# Patient Record
Sex: MALE | Race: BLACK OR AFRICAN AMERICAN | NOT HISPANIC OR LATINO | Employment: FULL TIME | URBAN - METROPOLITAN AREA
[De-identification: names, ages, dates, MRNs, and addresses within clinical notes are randomized per-mention and may not be internally consistent; named-entity substitution may affect disease eponyms.]

---

## 2023-01-15 ENCOUNTER — NURSE TRIAGE (OUTPATIENT)
Dept: ADMINISTRATIVE | Facility: CLINIC | Age: 38
End: 2023-01-15

## 2023-01-15 NOTE — TELEPHONE ENCOUNTER
Attempted to call pt x2, no answer. No VM available. No alternate phone number listed.     Reason for Disposition   Second attempt to contact caller AND no contact made. Phone number verified.    Protocols used: No Contact or Duplicate Contact Call-A-AH

## 2023-01-16 ENCOUNTER — NURSE TRIAGE (OUTPATIENT)
Dept: ADMINISTRATIVE | Facility: CLINIC | Age: 38
End: 2023-01-16

## 2023-01-16 ENCOUNTER — TELEPHONE (OUTPATIENT)
Dept: ADMINISTRATIVE | Facility: CLINIC | Age: 38
End: 2023-01-16

## 2023-01-16 NOTE — TELEPHONE ENCOUNTER
Pt contacted OOC. Made 2 attempts to reach pt with no success. Left voicemail advising if still need further assistance to contact OOC.    Reason for Disposition   Message left on unidentified voice mail.  Phone number verified.    Protocols used: No Contact or Duplicate Contact Call-A-

## 2023-06-09 ENCOUNTER — OFFICE VISIT (OUTPATIENT)
Dept: BEHAVIORAL HEALTH | Facility: CLINIC | Age: 38
End: 2023-06-09
Payer: MEDICAID

## 2023-06-09 VITALS
OXYGEN SATURATION: 100 % | WEIGHT: 249.88 LBS | SYSTOLIC BLOOD PRESSURE: 138 MMHG | HEART RATE: 71 BPM | DIASTOLIC BLOOD PRESSURE: 87 MMHG | TEMPERATURE: 97 F

## 2023-06-09 DIAGNOSIS — F41.9 ANXIETY: ICD-10-CM

## 2023-06-09 DIAGNOSIS — F33.1 MODERATE EPISODE OF RECURRENT MAJOR DEPRESSIVE DISORDER: Primary | ICD-10-CM

## 2023-06-09 PROCEDURE — 1159F MED LIST DOCD IN RCRD: CPT | Mod: CPTII,,, | Performed by: STUDENT IN AN ORGANIZED HEALTH CARE EDUCATION/TRAINING PROGRAM

## 2023-06-09 PROCEDURE — 1160F PR REVIEW ALL MEDS BY PRESCRIBER/CLIN PHARMACIST DOCUMENTED: ICD-10-PCS | Mod: CPTII,,, | Performed by: STUDENT IN AN ORGANIZED HEALTH CARE EDUCATION/TRAINING PROGRAM

## 2023-06-09 PROCEDURE — 1159F PR MEDICATION LIST DOCUMENTED IN MEDICAL RECORD: ICD-10-PCS | Mod: CPTII,,, | Performed by: STUDENT IN AN ORGANIZED HEALTH CARE EDUCATION/TRAINING PROGRAM

## 2023-06-09 PROCEDURE — 3075F PR MOST RECENT SYSTOLIC BLOOD PRESS GE 130-139MM HG: ICD-10-PCS | Mod: CPTII,,, | Performed by: STUDENT IN AN ORGANIZED HEALTH CARE EDUCATION/TRAINING PROGRAM

## 2023-06-09 PROCEDURE — 99205 OFFICE O/P NEW HI 60 MIN: CPT | Mod: S$PBB,,, | Performed by: STUDENT IN AN ORGANIZED HEALTH CARE EDUCATION/TRAINING PROGRAM

## 2023-06-09 PROCEDURE — 1160F RVW MEDS BY RX/DR IN RCRD: CPT | Mod: CPTII,,, | Performed by: STUDENT IN AN ORGANIZED HEALTH CARE EDUCATION/TRAINING PROGRAM

## 2023-06-09 PROCEDURE — 3075F SYST BP GE 130 - 139MM HG: CPT | Mod: CPTII,,, | Performed by: STUDENT IN AN ORGANIZED HEALTH CARE EDUCATION/TRAINING PROGRAM

## 2023-06-09 PROCEDURE — 3079F DIAST BP 80-89 MM HG: CPT | Mod: CPTII,,, | Performed by: STUDENT IN AN ORGANIZED HEALTH CARE EDUCATION/TRAINING PROGRAM

## 2023-06-09 PROCEDURE — 99213 OFFICE O/P EST LOW 20 MIN: CPT | Mod: PBBFAC,PN | Performed by: STUDENT IN AN ORGANIZED HEALTH CARE EDUCATION/TRAINING PROGRAM

## 2023-06-09 PROCEDURE — 99205 PR OFFICE/OUTPT VISIT, NEW, LEVL V, 60-74 MIN: ICD-10-PCS | Mod: S$PBB,,, | Performed by: STUDENT IN AN ORGANIZED HEALTH CARE EDUCATION/TRAINING PROGRAM

## 2023-06-09 PROCEDURE — 3079F PR MOST RECENT DIASTOLIC BLOOD PRESSURE 80-89 MM HG: ICD-10-PCS | Mod: CPTII,,, | Performed by: STUDENT IN AN ORGANIZED HEALTH CARE EDUCATION/TRAINING PROGRAM

## 2023-06-09 RX ORDER — VENLAFAXINE HYDROCHLORIDE 75 MG/1
75 CAPSULE, EXTENDED RELEASE ORAL DAILY
Qty: 30 CAPSULE | Refills: 2 | Status: SHIPPED | OUTPATIENT
Start: 2023-06-09 | End: 2023-07-27 | Stop reason: SDUPTHER

## 2023-06-09 RX ORDER — ESZOPICLONE 3 MG/1
3 TABLET, FILM COATED ORAL NIGHTLY PRN
COMMUNITY
Start: 2023-05-28

## 2023-06-09 RX ORDER — MODAFINIL 200 MG/1
200 TABLET ORAL EVERY MORNING
COMMUNITY
Start: 2023-04-17 | End: 2023-12-18 | Stop reason: SDUPTHER

## 2023-06-09 RX ORDER — ALPRAZOLAM 2 MG/1
2 TABLET ORAL 3 TIMES DAILY
COMMUNITY
Start: 2023-02-07 | End: 2023-08-17 | Stop reason: SDUPTHER

## 2023-06-09 RX ORDER — VORTIOXETINE 20 MG/1
1 TABLET, FILM COATED ORAL
COMMUNITY
Start: 2023-05-25 | End: 2023-06-09 | Stop reason: DRUGHIGH

## 2023-06-09 RX ORDER — VORTIOXETINE 10 MG/1
10 TABLET, FILM COATED ORAL DAILY
Qty: 30 TABLET | Refills: 0 | Status: SHIPPED | OUTPATIENT
Start: 2023-06-09 | End: 2023-07-27 | Stop reason: ALTCHOICE

## 2023-06-09 NOTE — PROGRESS NOTES
"Outpatient Psychiatry Initial Visit    6/9/2023    Sandy Evans, a 37 y.o. male, presenting for initial evaluation visit. Met with patient.    Reason for Encounter:   Referred from: Marciano Dominguez MD  Reason for referral: "depression"  Chief complaint: depression x years    History of Present Illness:   Pt is a 36yo M w/ PPHx of depression and anxiety  who presents to psychiatry clinic for evaluation.      Pt referred from PCP for worsening depression.  Currently prescribed xanax 2mg tid, lunesta 3mg nightly, and trintellix 20mg daily.  Voiced worsening depression with suicidal thinking at last clinic note. Trintellix increased to 20mg daily at visit with PCP.  Notes depression has been worse of past 4 months than typical for him. 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  Has been feeling burnt out, looking for new employment.      Regarding depression, pt endorses history of depressive episodes.  Regarding historical depressive episodes, episodes usually last weeks to months in duration.  Episodes are usually associated with identifiable triggers.  Depressive mood associated with decreased appetite, poor sleep (unrestful sleep), poor concentration, decreased energy, + anhedonia, poor motivation, endorses irritability, endorses hopelessness.  Endorses history of suicidal thoughts (mostly rumination about death), denies history of suicide attempts.  Denies current suicidal ideation, denies plan or desire for self harm or harm to others.    Denies history of episodes concerning for mike/hypomania.      Denies history of hallucinations or other altered perceptions, denies paranoid ideation.      Endorses excess worry/anxiety.  Worries are mostly associated with ongoing stressors, anxiety worsens with severe depression.  Notes associated symptoms: denies rumination, denies sleep difficulty, poor concentration, endorses irritability, denies tension or " "feeling "on edge," denies muscle tension, denies HA, denies GI upset. Denies panic attacks.  Taking xanax once monthly.     Meds Hx (has pt taken the following):   SSRIs: lexapro (helpful, no SE)  SNRIs: denies  TCAs: denies  Atypical ADs: trintellix (somewhat helpful, SE nausea)  Anxiolytics: xanax (somewhat helpful, no SE)  Neuroleptics: denies  Mood stabilizers: denies  Stimulants: denies  Other: lunesta (helpful, SE morning grogginess)    History:     Allergies:  Patient has no known allergies.    Past Medical/Surgical History:  History reviewed. No pertinent past medical history.  History reviewed. No pertinent surgical history.    Medications  Outpatient Encounter Medications as of 6/9/2023   Medication Sig Dispense Refill    ALPRAZolam (XANAX) 2 MG Tab Take 2 mg by mouth 3 (three) times daily.      eszopiclone (LUNESTA) 3 mg Tab Take 3 mg by mouth nightly as needed.      modafiniL (PROVIGIL) 200 MG Tab Take 200 mg by mouth every morning.      [DISCONTINUED] TRINTELLIX 20 mg Tab Take 1 tablet by mouth.      venlafaxine (EFFEXOR-XR) 75 MG 24 hr capsule Take 1 capsule (75 mg total) by mouth once daily. 30 capsule 2    vortioxetine (TRINTELLIX) 10 mg Tab Take 1 tablet (10 mg total) by mouth once daily. 30 tablet 0     No facility-administered encounter medications on file as of 6/9/2023.     Past Psychiatric History:  Previous Medication Trials: See above   Previous Psychiatric Hospitalizations: denies   Previous Suicide Attempts: denies   History of Violence: None in past 6 months  Outpatient mental health: seeing therapist (sees every 3 weeks)  Family History: mother with depression with catatonia (no ECT needed)    Social History:  Marital Status: single  Children: 0   Employment Status/Info: [employer]  Education: HS grad, college grad, [employer]  Housing Status: lives in house with mother  History of phys/sexual abuse: denies  Access to gun: denies    Substance Abuse History:  Tobacco Use: smoke cigares  Use " of Alcohol: 4x weekly, 1-2 glasses wine per sitting  Recreational Drugs: cannabis   Rehab/detox: denies    Legal History:  Past Charges/Incarcerations: denies   Pending charges: denies     Psychosocial Stressors: occupational    Review Of Systems:     Constitutional: denies fevers, denies chills, denies recent weight change  Eyes: denies pain in eyes or loss of vision  Ears: denies tinnitis, denies loss of hearing  Mouth/throat: denies difficulty with speaking, denies difficulty with swallowing  Cardiac: denies CP, denies palpitations  Respiratory: denies SOB, denies cough  Gastrointestinal: denies abdominal pain, denies nausea/vomiting, denies constipation/diarrhea  Genitourinary: denies urinary frequency, denies burning on urination  Dermatologic: denies rash, denies erythema  Musculoskeletal: denies myalgias, denies arthralgias  Hematologic: denies easy bleeding/bruising, denies enlarged lymph nodes  Neurologic: denies seizures, denies headaches, denies loss of sensation, denies weakness  Psychiatric: see HPI    Current Evaluation:     Nutritional Screening: Considering the patient's height and weight, medications, medical history and preferences, should a referral be made to the dietitian? no    Constitutional  Vitals:  Most recent vital signs, dated less than 90 days prior to this appointment, were reviewed.      Vitals:    06/09/23 0918   BP: 138/87   Pulse: 71   Temp: 97.3 °F (36.3 °C)   SpO2: 100%   Weight: 113.4 kg (249 lb 14.4 oz)        General:    No acute distress     Neurologic:   Motor: moves all extremities spontaneously and without difficulty  Gait: normal gait and station    Mental status examination:  Appearance: unremarkable, age appropriate  Level of Consciousness: awake and alert  Behavior/Cooperation: calm and cooperative  Psychomotor: psychomotor agitation  Speech:  rapid, not pressured, normal tone, normal volume  Language: english, fluid  Orientation: grossly intact, person, place,  "situation, day of week, month of year, year  Attention Span/Concentration: intact to interview and spells "WORLD" forwards and backwards without error  Memory: Registers 3/3 objects, recalls   /3 objects at 5 minutes without cuing  Mood: "mellow"  Affect: mood congruent and euthymic  Thought Process: tangential  Associations: Logical and appropriate  Thought Content: denies SI/HI/paranoia, no delusional ideation volunteered, denies plan or desire for self harm or harm to others  Fund of Knowledge: appropriate for education  Abstraction: proverbs were abstract and similarities were abstract  Insight: good  Judgment: good    Relevant Elements of Neurological Exam: no abnormal involuntary movements observed    Functioning in Relationships:  Spouse/partner: not in dating relationship  Peers: good  Employers: good    Assessments:   PHQ9:   PHQ9 6/9/2023   Total Score 11     GAD7:   GAD7 6/9/2023   1. Feeling nervous, anxious, or on edge? 0   2. Not being able to stop or control worrying? 1   3. Worrying too much about different things? 1   4. Trouble relaxing? 0   5. Being so restless that it is hard to sit still? 0   6. Becoming easily annoyed or irritable? 2   7. Feeling afraid as if something awful might happen? 0   8. If you checked off any problems, how difficult have these problems made it for you to do your work, take care of things at home, or get along with other people? 1   SUJEY-7 Score 4     Laboratory Data  No visits with results within 1 Month(s) from this visit.   Latest known visit with results is:   No results found for any previous visit.     Assessment - Diagnosis - Goals:     Sandy Evans, a 37 y.o. male, presenting for initial evaluation visit.     Impression:       ICD-10-CM ICD-9-CM   1. Moderate episode of recurrent major depressive disorder  F33.1 296.32   2. Anxiety  F41.9 300.00     Strengths and Liabilities: Strength: Patient accepts guidance/feedback, Strength: Patient is " expressive/articulate., Strength: Patient is intelligent.    Treatment Goals:  Specify outcomes written in observable, behavioral terms:   Anxiety: reducing physical symptoms of anxiety and reducing time spent worrying (<30 minutes/day)  Depression: increasing energy, increasing interest in usual activities, increasing motivation, and reducing fatigue    Treatment Plan/Recommendations:   Will cross titrate trintellix to effexor, pt notes <50% improvement with max dosing of trintellix  Decrease trintellix to 10mg daily, start effexor XR 75mg daily, Discussed potential SE including but not limited to GI upset, headache, HTN, tachycardia, suicidal thinking  Continue xanax prn (pt taking infrequently)  Continue lunesta 3mg daily  Recommend pt continue with current therapist  Encouraged pt to continue using CPAP for IRAIS, defer management of modafinil to PCP  No need for PEC as pt is not an imminent danger to self or others or gravely disabled due to acute psychiatric illness  Discussed that pt should either call clinic for psychiatric crisis symptoms or present to nearest emergency room    Discussed with patient informed consent including diagnosis, risks and benefits of proposed treatment above vs. alternative treatments vs. no treatment, as well as serious and common side effects of these treatments, and the inherent unpredictability of individual responses to these treatments. The patient expresses understanding of the above and displays the capacity to agree with this current plan. Patient also agrees that, currently, the benefits outweigh the risks and would like to pursue treatment at this time, and had no other questions.    Instructions:  Take all medications as prescribed.    Abstain from recreational drugs and alcohol.  Present to ED or call 911 for SI/HI plan or intent, psychosis, or medical emergency.    Return to Clinic: Follow up in about 2 weeks (around 6/23/2023) for Virtual Visit.    Total time:    Complexity (level) of medical decision making employed in the encounter: MODERATE    The total time for services performed on the date of the encounter (including review of prior visit notes, review of notes from other providers, review of results from laboratory/imaging studies, face-to-face time with patient, and time spent on other activities directly related to patient care): 60 minutes.    Uriah Donahue MD  [facility]

## 2023-06-19 ENCOUNTER — OFFICE VISIT (OUTPATIENT)
Dept: BEHAVIORAL HEALTH | Facility: CLINIC | Age: 38
End: 2023-06-19
Payer: MEDICAID

## 2023-06-19 DIAGNOSIS — F33.1 MODERATE EPISODE OF RECURRENT MAJOR DEPRESSIVE DISORDER: Primary | ICD-10-CM

## 2023-06-19 DIAGNOSIS — F41.9 ANXIETY: ICD-10-CM

## 2023-06-19 PROCEDURE — 99214 OFFICE O/P EST MOD 30 MIN: CPT | Mod: 95,,, | Performed by: STUDENT IN AN ORGANIZED HEALTH CARE EDUCATION/TRAINING PROGRAM

## 2023-06-19 PROCEDURE — 1160F RVW MEDS BY RX/DR IN RCRD: CPT | Mod: CPTII,95,, | Performed by: STUDENT IN AN ORGANIZED HEALTH CARE EDUCATION/TRAINING PROGRAM

## 2023-06-19 PROCEDURE — 1160F PR REVIEW ALL MEDS BY PRESCRIBER/CLIN PHARMACIST DOCUMENTED: ICD-10-PCS | Mod: CPTII,95,, | Performed by: STUDENT IN AN ORGANIZED HEALTH CARE EDUCATION/TRAINING PROGRAM

## 2023-06-19 PROCEDURE — 1159F MED LIST DOCD IN RCRD: CPT | Mod: CPTII,95,, | Performed by: STUDENT IN AN ORGANIZED HEALTH CARE EDUCATION/TRAINING PROGRAM

## 2023-06-19 PROCEDURE — 1159F PR MEDICATION LIST DOCUMENTED IN MEDICAL RECORD: ICD-10-PCS | Mod: CPTII,95,, | Performed by: STUDENT IN AN ORGANIZED HEALTH CARE EDUCATION/TRAINING PROGRAM

## 2023-06-19 PROCEDURE — 99214 PR OFFICE/OUTPT VISIT, EST, LEVL IV, 30-39 MIN: ICD-10-PCS | Mod: 95,,, | Performed by: STUDENT IN AN ORGANIZED HEALTH CARE EDUCATION/TRAINING PROGRAM

## 2023-06-19 NOTE — PROGRESS NOTES
"Outpatient Psychiatry Follow-Up Visit    6/19/2023    Clinical Status of Patient:  Outpatient (Ambulatory)    Chief Complaint:  Sandy Evans is a 37 y.o. male who presents today for follow-up of depression and anxiety. Patient last seen for initial evaluation on 6/9/2023. Met with patient.      TELE PSYCHIATRY Disclaimer   *The patient was informed despite using HIPPA compliant technology there may be risks including security breach, technological failure, inability to perform a comprehensive physical exam which could delay or prevent an accurate diagnosis, and potential complications from treatment decisions rendered over a telemedical platform.   The patient was also informed of the relationship between the physician and patient and the respective role of any other health care provider with respect to management of the patient; and notified that the pt may decline to receive medical services by telemedicine and may withdraw from such care at any time.     Patient's Current location: [address] [place] [address]    In Case of Emergency pts next of kin  Name:None provided  Phone number:  n/a  Visit type: Audio only  Total time spent with patient: 15 minutes    Interval History and Content of Current Session:  Interim Events/Subjective Report/Content of Current Session:   **Audio only visit due to pt having difficulty with accessing account.**    Pt reports doing "stable"  overall.  Reports stable mood, decreased anxiety.  Sleeping stable.  Appetite stable, weight stable.  Energy unchanged, motivation stable.  Improved irritability.  Denies SI/HI/AVH/paranoia, denies plan or desire for self harm or harm to others.  Denies SE from current regimen Denies somatic complaints.   Pt happy with current regimen and wants to continue.     Psychiatric Review of Systems-is patient experiencing or having changes in  Anxiety: decreased  Sleep: unchanged  Appetite: stable  Weight: stable  Energy: " "unchanged  Concentration: fair   Libido: no problem voiced  Irritability: denies  Motivation: stable  Guilt/hopelessness: denies  Paranoia/delusions: denies  SIB/risky behavior: denies    Review of Systems   PSYCHIATRIC: Pertinant items are noted in the narrative.  CONSTITUTIONAL: No weight gain or loss.  MUSCULOSKELETAL: No pain or stiffness of the joints.  NEUROLOGIC: No weakness, sensory changes, seizures, confusion, memory loss, tremor or other abnormal movements.  CARDIAC: No CP, no palpitations  RESPIRATORY: No shortness of breath.  CARDIOVASCULAR: No tachycardia or chest pain.  GASTROINTESTINAL: No nausea, vomiting, pain, constipation or diarrhea.    Past Medical, Family and Social History: The patient's past medical, family and social history have been reviewed and updated as appropriate within the electronic medical record - see encounter notes.    Compliance: good    Side effects: denies    Risk Parameters:  Patient reports no suicidal ideation  Patient reports no homicidal ideation  Patient reports no self-injurious behavior  Patient reports no violent behavior    Exam (detailed: at least 9 elements; comprehensive: all 15 elements)   Constitutional  Vitals:  Most recent vital signs, dated less than 90 days prior to this appointment, were reviewed.     There were no vitals filed for this visit.       General:   Constitutional: HAYDEN 2/2 virtual visit    Neurologic:   Motor: HAYDEN 2/2 virtual visit  Gait: HAYDEN 2/2 virtual visit    Mental status examination:   Appearance: HAYDEN 2/2 virtual visit  Behavior: calm and cooperative  Mood: "fine"  Affect: HAYDEN 2/2 virtual visit  Thought process: linear and goal directed  Associations: appropriate for conversation  Thought content: no plan or desire for self harm or harm to others, denies paranoia, no delusional ideation volunteered  Perceptions: denies hallucinations or other altered perceptions  Orientation: oriented to day of week, month, year, location, and " situation  Language: English, fluid  Attention: able to attend to interview  Insight: good  Judgement: good    PHQ9 6/9/2023   Total Score 11       GAD7 6/9/2023   1. Feeling nervous, anxious, or on edge? 0   2. Not being able to stop or control worrying? 1   3. Worrying too much about different things? 1   4. Trouble relaxing? 0   5. Being so restless that it is hard to sit still? 0   6. Becoming easily annoyed or irritable? 2   7. Feeling afraid as if something awful might happen? 0   8. If you checked off any problems, how difficult have these problems made it for you to do your work, take care of things at home, or get along with other people? 1   SUJEY-7 Score 4     Assessment and Diagnosis   Status/Progress: Based on the examination today, the patient's problem(s) is/are improved.  New problems have not been presented today.   Co-morbidities and Lack of compliance are not complicating management of the primary condition.  Number of separate conditions addressed during today's visit: 2 (anxiety stable, mood stable).  Are medication adjustments being made today: No.  Are referral(s) being ordered today: No.  Complexity (level) of medical decision making employed in the encounter: MODERATE.    General Impression:    ICD-10-CM ICD-9-CM   1. Moderate episode of recurrent major depressive disorder  F33.1 296.32   2. Anxiety  F41.9 300.00     Intervention/Counseling/Treatment Plan   Continue effexor XR 75mg daily  Continue xanax prn (pt taking infrequently)  Continue lunesta 3mg daily  Recommend pt continue with current therapist  Encouraged pt to continue using CPAP for IRAIS, defer management of modafinil to PCP  No need for PEC as pt is not an imminent danger to self or others or gravely disabled due to acute psychiatric illness  Discussed that pt should either call clinic for psychiatric crisis symptoms or present to nearest emergency room    Discussed with patient informed consent including diagnosis, risks and  benefits of proposed treatment above vs. alternative treatments vs. no treatment, as well as serious and common side effects of these treatments, and the inherent unpredictability of individual responses to these treatments. The patient expresses understanding of the above and displays the capacity to agree with this current plan. Patient also agrees that, currently, the benefits outweigh the risks and would like to pursue treatment at this time, and had no other questions.    Instructions:  Take all medications as prescribed.    Abstain from recreational drugs and alcohol.  Present to ED or call 911 for SI/HI plan or intent, psychosis, or medical emergency.    Return to Clinic: Follow up in about 4 weeks (around 7/17/2023).    Total time:   The total time for services performed on the date of the encounter (including review of prior visit notes, review of notes from other providers, review of results from laboratory/imaging studies, face-to-face time with patient, and time spent on other activities directly related to patient care): 15 minutes.    Uriah Donahue MD  [facility]

## 2023-07-25 ENCOUNTER — TELEPHONE (OUTPATIENT)
Dept: FAMILY MEDICINE | Facility: CLINIC | Age: 38
End: 2023-07-25
Payer: COMMERCIAL

## 2023-07-27 ENCOUNTER — OFFICE VISIT (OUTPATIENT)
Dept: BEHAVIORAL HEALTH | Facility: CLINIC | Age: 38
End: 2023-07-27
Payer: COMMERCIAL

## 2023-07-27 VITALS
WEIGHT: 248.63 LBS | DIASTOLIC BLOOD PRESSURE: 83 MMHG | TEMPERATURE: 98 F | HEIGHT: 74 IN | HEART RATE: 76 BPM | SYSTOLIC BLOOD PRESSURE: 119 MMHG | BODY MASS INDEX: 31.91 KG/M2

## 2023-07-27 DIAGNOSIS — F33.2 SEVERE EPISODE OF RECURRENT MAJOR DEPRESSIVE DISORDER, WITHOUT PSYCHOTIC FEATURES: Primary | ICD-10-CM

## 2023-07-27 DIAGNOSIS — F41.9 ANXIETY: ICD-10-CM

## 2023-07-27 PROCEDURE — 1160F RVW MEDS BY RX/DR IN RCRD: CPT | Mod: CPTII,,, | Performed by: STUDENT IN AN ORGANIZED HEALTH CARE EDUCATION/TRAINING PROGRAM

## 2023-07-27 PROCEDURE — 1159F MED LIST DOCD IN RCRD: CPT | Mod: CPTII,,, | Performed by: STUDENT IN AN ORGANIZED HEALTH CARE EDUCATION/TRAINING PROGRAM

## 2023-07-27 PROCEDURE — 3079F DIAST BP 80-89 MM HG: CPT | Mod: CPTII,,, | Performed by: STUDENT IN AN ORGANIZED HEALTH CARE EDUCATION/TRAINING PROGRAM

## 2023-07-27 PROCEDURE — 99213 OFFICE O/P EST LOW 20 MIN: CPT | Mod: PBBFAC,PN | Performed by: STUDENT IN AN ORGANIZED HEALTH CARE EDUCATION/TRAINING PROGRAM

## 2023-07-27 PROCEDURE — 3074F SYST BP LT 130 MM HG: CPT | Mod: CPTII,,, | Performed by: STUDENT IN AN ORGANIZED HEALTH CARE EDUCATION/TRAINING PROGRAM

## 2023-07-27 PROCEDURE — 1159F PR MEDICATION LIST DOCUMENTED IN MEDICAL RECORD: ICD-10-PCS | Mod: CPTII,,, | Performed by: STUDENT IN AN ORGANIZED HEALTH CARE EDUCATION/TRAINING PROGRAM

## 2023-07-27 PROCEDURE — 99214 PR OFFICE/OUTPT VISIT, EST, LEVL IV, 30-39 MIN: ICD-10-PCS | Mod: S$PBB,,, | Performed by: STUDENT IN AN ORGANIZED HEALTH CARE EDUCATION/TRAINING PROGRAM

## 2023-07-27 PROCEDURE — 3008F PR BODY MASS INDEX (BMI) DOCUMENTED: ICD-10-PCS | Mod: CPTII,,, | Performed by: STUDENT IN AN ORGANIZED HEALTH CARE EDUCATION/TRAINING PROGRAM

## 2023-07-27 PROCEDURE — 3008F BODY MASS INDEX DOCD: CPT | Mod: CPTII,,, | Performed by: STUDENT IN AN ORGANIZED HEALTH CARE EDUCATION/TRAINING PROGRAM

## 2023-07-27 PROCEDURE — 1160F PR REVIEW ALL MEDS BY PRESCRIBER/CLIN PHARMACIST DOCUMENTED: ICD-10-PCS | Mod: CPTII,,, | Performed by: STUDENT IN AN ORGANIZED HEALTH CARE EDUCATION/TRAINING PROGRAM

## 2023-07-27 PROCEDURE — 3074F PR MOST RECENT SYSTOLIC BLOOD PRESSURE < 130 MM HG: ICD-10-PCS | Mod: CPTII,,, | Performed by: STUDENT IN AN ORGANIZED HEALTH CARE EDUCATION/TRAINING PROGRAM

## 2023-07-27 PROCEDURE — 3079F PR MOST RECENT DIASTOLIC BLOOD PRESSURE 80-89 MM HG: ICD-10-PCS | Mod: CPTII,,, | Performed by: STUDENT IN AN ORGANIZED HEALTH CARE EDUCATION/TRAINING PROGRAM

## 2023-07-27 PROCEDURE — 99214 OFFICE O/P EST MOD 30 MIN: CPT | Mod: S$PBB,,, | Performed by: STUDENT IN AN ORGANIZED HEALTH CARE EDUCATION/TRAINING PROGRAM

## 2023-07-27 RX ORDER — QUETIAPINE FUMARATE 100 MG/1
100 TABLET, FILM COATED ORAL NIGHTLY
Qty: 30 TABLET | Refills: 5 | Status: SHIPPED | OUTPATIENT
Start: 2023-07-27 | End: 2023-08-09 | Stop reason: SINTOL

## 2023-07-27 RX ORDER — VENLAFAXINE HYDROCHLORIDE 150 MG/1
150 CAPSULE, EXTENDED RELEASE ORAL DAILY
Qty: 30 CAPSULE | Refills: 2 | Status: SHIPPED | OUTPATIENT
Start: 2023-07-27 | End: 2023-10-05 | Stop reason: SDUPTHER

## 2023-07-27 NOTE — PROGRESS NOTES
"Outpatient Psychiatry Follow-Up Visit    7/27/2023    Clinical Status of Patient:  Outpatient (Ambulatory)    Chief Complaint:  Sandy Evans is a 37 y.o. male who presents today for follow-up of depression and anxiety. Patient last seen for follow-up on 6/19/2023. Met with patient.      Interval History and Content of Current Session:  Interim Events/Subjective Report/Content of Current Session:   Pt reluctant to participate in conversation.  Pt reports doing "not good"  overall.  Notes "things have been getting darker."  Notes feeling much more depressed.  Unclear reason for worsening of symptoms.  +hopelessness, +rumination about death.  Denies plan or desire for self harm or harm to others.  Elevated anxiety.  Sleep poor despite lunesta.  Appetite low, weight stable.  Energy poor, motivation poor.  Endorses irritability.  Denies SE from current regimen. Denies somatic complaints.   Wants to take time off of work due to ongoing symptoms.  Pt voices desire to adjust regimen to address ongoing symptoms.      Psychiatric Review of Systems-is patient experiencing or having changes in  Anxiety: elevated  Sleep: poor  Appetite: low  Weight: stable  Energy: poor  Concentration: poor  Libido: no problem voiced  Irritability: endorses  Motivation: poor  Guilt/hopelessness: endorses  Paranoia/delusions: denies  SIB/risky behavior: denies    Review of Systems   PSYCHIATRIC: Pertinant items are noted in the narrative.  CONSTITUTIONAL: No weight gain or loss.  MUSCULOSKELETAL: No pain or stiffness of the joints.  NEUROLOGIC: No weakness, sensory changes, seizures, confusion, memory loss, tremor or other abnormal movements.  CARDIAC: No CP, no palpitations  RESPIRATORY: No shortness of breath.  CARDIOVASCULAR: No tachycardia or chest pain.  GASTROINTESTINAL: No nausea, vomiting, pain, constipation or diarrhea.    Past Medical, Family and Social History: The patient's past medical, family and social history have been " "reviewed and updated as appropriate within the electronic medical record - see encounter notes.    Compliance: good    Side effects: denies    Risk Parameters:  Patient reports no suicidal ideation  Patient reports no homicidal ideation  Patient reports no self-injurious behavior  Patient reports no violent behavior    Exam (detailed: at least 9 elements; comprehensive: all 15 elements)   Constitutional  Vitals:  Most recent vital signs, dated less than 90 days prior to this appointment, were reviewed.     Vitals:    07/27/23 1540   BP: 119/83   Pulse: 76   Temp: 98 °F (36.7 °C)   TempSrc: Oral   Weight: 112.8 kg (248 lb 9.6 oz)   Height: 6' 2" (1.88 m)        General:   Constitutional:appears stated age, no acute distress    Neurologic:   Motor: moves all extremities spontaneously and without difficulty  Gait: normal gait and station    Mental status examination:   Appearance: appears stated age, casually dressed  Behavior: poor eye contact, reluctant to participate  Mood: "dark"  Affect: mood congruent, constricted  Thought process: ruminative  Associations: appropriate for conversation  Thought content: no plan or desire for self harm or harm to others, denies paranoia, no delusional ideation volunteered  Perceptions: denies hallucinations or other altered perceptions  Orientation: oriented to day of week, month, year, location, and situation  Language: English, fluid  Attention: able to attend to interview  Insight: good  Judgement: good    PHQ9 7/27/2023   Total Score 19     GAD7 7/27/2023 6/9/2023   1. Feeling nervous, anxious, or on edge? 2 0   2. Not being able to stop or control worrying? 2 1   3. Worrying too much about different things? 3 1   4. Trouble relaxing? 3 0   5. Being so restless that it is hard to sit still? 2 0   6. Becoming easily annoyed or irritable? 1 2   7. Feeling afraid as if something awful might happen? 1 0   8. If you checked off any problems, how difficult have these problems made " it for you to do your work, take care of things at home, or get along with other people? 1 1   SUJEY-7 Score 14 4     Assessment and Diagnosis   Status/Progress: Based on the examination today, the patient's problem(s) is/are worsening.  New problems have not been presented today.   Co-morbidities and Lack of compliance are not complicating management of the primary condition.  Number of separate conditions addressed during today's visit: 3 (mood worsening, anxiety worsening, insomnia worsening).  Are medication adjustments being made today: Yes.  Are referral(s) being ordered today: No.  Complexity (level) of medical decision making employed in the encounter: HIGH.    General Impression:    ICD-10-CM ICD-9-CM   1. Severe episode of recurrent major depressive disorder, without psychotic features  F33.2 296.33   2. Anxiety  F41.9 300.00     Intervention/Counseling/Treatment Plan   Increase effexor XR to 150mg daily  Start seroquel 100mg nightly for depression augmentation, Discussed potential SE including but not limited to sedation, metabolic disturbance, weight gain, movement disorder (including TD)  Continue xanax prn (pt taking 1-2x daily)  Continue lunesta 3mg daily  Recommend pt continue with current therapist  Consider IOP, will follow up with patient closely  Will complete short term disability paperwork  Encouraged pt to continue using CPAP for IRAIS, defer management of modafinil to PCP  No need for PEC as pt is not an imminent danger to self or others or gravely disabled due to acute psychiatric illness  Discussed that pt should either call clinic for psychiatric crisis symptoms or present to nearest emergency room    Discussed with patient informed consent including diagnosis, risks and benefits of proposed treatment above vs. alternative treatments vs. no treatment, as well as serious and common side effects of these treatments, and the inherent unpredictability of individual responses to these treatments.  The patient expresses understanding of the above and displays the capacity to agree with this current plan. Patient also agrees that, currently, the benefits outweigh the risks and would like to pursue treatment at this time, and had no other questions.    Instructions:  Take all medications as prescribed.    Abstain from recreational drugs and alcohol.  Present to ED or call 911 for SI/HI plan or intent, psychosis, or medical emergency.    Return to Clinic: Follow up in about 2 weeks (around 8/10/2023).    Total time:   The total time for services performed on the date of the encounter (including review of prior visit notes, review of notes from other providers, review of results from laboratory/imaging studies, face-to-face time with patient, and time spent on other activities directly related to patient care): 15 minutes.    Uriah Donahue MD  [facility]

## 2023-08-07 ENCOUNTER — HOSPITAL ENCOUNTER (EMERGENCY)
Facility: HOSPITAL | Age: 38
Discharge: HOME | End: 2023-08-08
Attending: STUDENT IN AN ORGANIZED HEALTH CARE EDUCATION/TRAINING PROGRAM
Payer: COMMERCIAL

## 2023-08-07 DIAGNOSIS — F32.A DEPRESSION, UNSPECIFIED DEPRESSION TYPE: Primary | ICD-10-CM

## 2023-08-07 PROCEDURE — 80143 DRUG ASSAY ACETAMINOPHEN: CPT | Performed by: STUDENT IN AN ORGANIZED HEALTH CARE EDUCATION/TRAINING PROGRAM

## 2023-08-07 PROCEDURE — 80053 COMPREHEN METABOLIC PANEL: CPT | Performed by: STUDENT IN AN ORGANIZED HEALTH CARE EDUCATION/TRAINING PROGRAM

## 2023-08-07 PROCEDURE — 84439 ASSAY OF FREE THYROXINE: CPT | Performed by: STUDENT IN AN ORGANIZED HEALTH CARE EDUCATION/TRAINING PROGRAM

## 2023-08-07 PROCEDURE — 99285 EMERGENCY DEPT VISIT HI MDM: CPT

## 2023-08-07 PROCEDURE — 82077 ASSAY SPEC XCP UR&BREATH IA: CPT | Performed by: STUDENT IN AN ORGANIZED HEALTH CARE EDUCATION/TRAINING PROGRAM

## 2023-08-07 PROCEDURE — 84443 ASSAY THYROID STIM HORMONE: CPT | Performed by: STUDENT IN AN ORGANIZED HEALTH CARE EDUCATION/TRAINING PROGRAM

## 2023-08-07 PROCEDURE — 84481 FREE ASSAY (FT-3): CPT | Performed by: STUDENT IN AN ORGANIZED HEALTH CARE EDUCATION/TRAINING PROGRAM

## 2023-08-07 RX ORDER — LORAZEPAM 1 MG/1
2 TABLET ORAL EVERY 4 HOURS PRN
Status: DISCONTINUED | OUTPATIENT
Start: 2023-08-08 | End: 2023-08-08 | Stop reason: HOSPADM

## 2023-08-07 RX ORDER — LORAZEPAM 2 MG/ML
2 INJECTION INTRAMUSCULAR EVERY 4 HOURS PRN
Status: DISCONTINUED | OUTPATIENT
Start: 2023-08-08 | End: 2023-08-08 | Stop reason: HOSPADM

## 2023-08-07 RX ORDER — DIPHENHYDRAMINE HCL 25 MG
50 CAPSULE ORAL EVERY 4 HOURS PRN
Status: DISCONTINUED | OUTPATIENT
Start: 2023-08-08 | End: 2023-08-08 | Stop reason: HOSPADM

## 2023-08-07 RX ORDER — HALOPERIDOL 5 MG/1
5 TABLET ORAL EVERY 12 HOURS PRN
Status: DISCONTINUED | OUTPATIENT
Start: 2023-08-08 | End: 2023-08-08 | Stop reason: HOSPADM

## 2023-08-07 RX ORDER — IBUPROFEN 200 MG
1 TABLET ORAL DAILY
Status: DISCONTINUED | OUTPATIENT
Start: 2023-08-08 | End: 2023-08-08 | Stop reason: HOSPADM

## 2023-08-07 RX ORDER — DIPHENHYDRAMINE HYDROCHLORIDE 50 MG/ML
50 INJECTION INTRAMUSCULAR; INTRAVENOUS EVERY 4 HOURS PRN
Status: DISCONTINUED | OUTPATIENT
Start: 2023-08-08 | End: 2023-08-08 | Stop reason: HOSPADM

## 2023-08-07 RX ORDER — HALOPERIDOL 5 MG/ML
5 INJECTION INTRAMUSCULAR EVERY 4 HOURS PRN
Status: DISCONTINUED | OUTPATIENT
Start: 2023-08-08 | End: 2023-08-08 | Stop reason: HOSPADM

## 2023-08-08 VITALS
BODY MASS INDEX: 32.08 KG/M2 | WEIGHT: 250 LBS | RESPIRATION RATE: 16 BRPM | TEMPERATURE: 98 F | SYSTOLIC BLOOD PRESSURE: 133 MMHG | OXYGEN SATURATION: 97 % | DIASTOLIC BLOOD PRESSURE: 78 MMHG | HEART RATE: 78 BPM | HEIGHT: 74 IN

## 2023-08-08 LAB
ALBUMIN SERPL-MCNC: 4.4 G/DL (ref 3.5–5)
ALBUMIN/GLOB SERPL: 1.4 RATIO (ref 1.1–2)
ALP SERPL-CCNC: 43 UNIT/L (ref 40–150)
ALT SERPL-CCNC: 18 UNIT/L (ref 0–55)
AMPHET UR QL SCN: NEGATIVE
APAP SERPL-MCNC: <17.4 UG/ML (ref 17.4–30)
APPEARANCE UR: CLEAR
AST SERPL-CCNC: 15 UNIT/L (ref 5–34)
BACTERIA #/AREA URNS AUTO: NORMAL /HPF
BARBITURATE SCN PRESENT UR: NEGATIVE
BASOPHILS # BLD AUTO: 0.03 X10(3)/MCL
BASOPHILS NFR BLD AUTO: 0.2 %
BENZODIAZ UR QL SCN: NEGATIVE
BILIRUB UR QL STRIP.AUTO: NEGATIVE
BILIRUBIN DIRECT+TOT PNL SERPL-MCNC: 0.4 MG/DL
BUN SERPL-MCNC: 9.3 MG/DL (ref 8.9–20.6)
CALCIUM SERPL-MCNC: 9.3 MG/DL (ref 8.4–10.2)
CANNABINOIDS UR QL SCN: POSITIVE
CHLORIDE SERPL-SCNC: 102 MMOL/L (ref 98–107)
CO2 SERPL-SCNC: 28 MMOL/L (ref 22–29)
COCAINE UR QL SCN: NEGATIVE
COLOR UR: YELLOW
CREAT SERPL-MCNC: 1.11 MG/DL (ref 0.73–1.18)
EOSINOPHIL # BLD AUTO: 0.02 X10(3)/MCL (ref 0–0.9)
EOSINOPHIL NFR BLD AUTO: 0.2 %
ERYTHROCYTE [DISTWIDTH] IN BLOOD BY AUTOMATED COUNT: 14.6 % (ref 11.5–17)
ETHANOL SERPL-MCNC: <10 MG/DL
FENTANYL UR QL SCN: NEGATIVE
GFR SERPLBLD CREATININE-BSD FMLA CKD-EPI: >60 MLS/MIN/1.73/M2
GLOBULIN SER-MCNC: 3.2 GM/DL (ref 2.4–3.5)
GLUCOSE SERPL-MCNC: 161 MG/DL (ref 74–100)
GLUCOSE UR QL STRIP.AUTO: NEGATIVE
HCT VFR BLD AUTO: 41.8 % (ref 42–52)
HGB BLD-MCNC: 13.9 G/DL (ref 14–18)
IMM GRANULOCYTES # BLD AUTO: 0.04 X10(3)/MCL (ref 0–0.04)
IMM GRANULOCYTES NFR BLD AUTO: 0.3 %
KETONES UR QL STRIP.AUTO: ABNORMAL
LEUKOCYTE ESTERASE UR QL STRIP.AUTO: NEGATIVE
LYMPHOCYTES # BLD AUTO: 0.81 X10(3)/MCL (ref 0.6–4.6)
LYMPHOCYTES NFR BLD AUTO: 6.6 %
MCH RBC QN AUTO: 27.3 PG (ref 27–31)
MCHC RBC AUTO-ENTMCNC: 33.3 G/DL (ref 33–36)
MCV RBC AUTO: 82.1 FL (ref 80–94)
MDMA UR QL SCN: NEGATIVE
MONOCYTES # BLD AUTO: 0.7 X10(3)/MCL (ref 0.1–1.3)
MONOCYTES NFR BLD AUTO: 5.7 %
NEUTROPHILS # BLD AUTO: 10.75 X10(3)/MCL (ref 2.1–9.2)
NEUTROPHILS NFR BLD AUTO: 87 %
NITRITE UR QL STRIP.AUTO: NEGATIVE
NRBC BLD AUTO-RTO: 0 %
OPIATES UR QL SCN: NEGATIVE
PCP UR QL: NEGATIVE
PH UR STRIP.AUTO: 5.5 [PH]
PH UR: 5.5 [PH] (ref 3–11)
PLATELET # BLD AUTO: 166 X10(3)/MCL (ref 130–400)
PMV BLD AUTO: 10.6 FL (ref 7.4–10.4)
POTASSIUM SERPL-SCNC: 3.9 MMOL/L (ref 3.5–5.1)
PROT SERPL-MCNC: 7.6 GM/DL (ref 6.4–8.3)
PROT UR QL STRIP.AUTO: NEGATIVE
RBC # BLD AUTO: 5.09 X10(6)/MCL (ref 4.7–6.1)
RBC #/AREA URNS AUTO: <5 /HPF
RBC UR QL AUTO: NEGATIVE
SARS-COV-2 RDRP RESP QL NAA+PROBE: NEGATIVE
SODIUM SERPL-SCNC: 140 MMOL/L (ref 136–145)
SP GR UR STRIP.AUTO: 1.02 (ref 1–1.03)
SPECIFIC GRAVITY, URINE AUTO (.000) (OHS): 1.02 (ref 1–1.03)
SQUAMOUS #/AREA URNS AUTO: <5 /HPF
T3FREE SERPL-MCNC: 2.91 PG/ML (ref 1.58–3.91)
T4 FREE SERPL-MCNC: 1.06 NG/DL (ref 0.7–1.48)
TSH SERPL-ACNC: 0.3 UIU/ML (ref 0.35–4.94)
UROBILINOGEN UR STRIP-ACNC: 1
WBC # SPEC AUTO: 12.35 X10(3)/MCL (ref 4.5–11.5)
WBC #/AREA URNS AUTO: <5 /HPF

## 2023-08-08 PROCEDURE — 87635 SARS-COV-2 COVID-19 AMP PRB: CPT | Performed by: STUDENT IN AN ORGANIZED HEALTH CARE EDUCATION/TRAINING PROGRAM

## 2023-08-08 PROCEDURE — 81001 URINALYSIS AUTO W/SCOPE: CPT | Mod: 59 | Performed by: STUDENT IN AN ORGANIZED HEALTH CARE EDUCATION/TRAINING PROGRAM

## 2023-08-08 PROCEDURE — 80307 DRUG TEST PRSMV CHEM ANLYZR: CPT | Performed by: STUDENT IN AN ORGANIZED HEALTH CARE EDUCATION/TRAINING PROGRAM

## 2023-08-08 PROCEDURE — 85025 COMPLETE CBC W/AUTO DIFF WBC: CPT | Performed by: STUDENT IN AN ORGANIZED HEALTH CARE EDUCATION/TRAINING PROGRAM

## 2023-08-08 NOTE — DISCHARGE INSTRUCTIONS
Keep existing appointment with your psychiatrist in 2 days.      Continue taking your medications as prescribed.      If any new or worsening symptoms return to the emergency department immediately.  If any thoughts of harming herself or anyone else, return to the emergency department immediately.  If any worsening depression return to emergency department immediately.

## 2023-08-08 NOTE — ED PROVIDER NOTES
"Encounter Date: 8/7/2023    SCRIBE #1 NOTE: I, Gerber Taylor, am scribing for, and in the presence of,  Osmany Maynard MD. I have scribed the following portions of the note - Other sections scribed: HPI, ROS, PE.       History     Chief Complaint   Patient presents with    Suicidal     SI intermittently for a while. "Googling about cutting and shooting himself". +ETOH today.     Patient is a 37 year old male presents to ED c/o mental health problem. Pt states that he has been having thoughts of his future and uncertainty where he fits in with "God's plan" over the past 2-3 days.  Patient denies any current SI.  Pt denies HI. Pt states that he he does not want to kill himself because he has his mother to take care of. Pt reports that he has been drinking tonight. Pt states that he sees a psychiatrist and and mental health counselor and that they have been significantly helpful with his mental health issues over the past 2.5 years. Pt reports that he is prescribed Effexor XR. Pt denies history of psychiatric hospitalization. Pt reports he uses CBD and synthetic cannabinoids, denies tobacco products.       Psychiatrist: Uriah Donahue MD        The history is provided by the patient. No  was used.   Mental Health Problem  The primary symptoms include self-injury (thoughts). The current episode started several weeks ago. This is a recurrent problem.   The onset of the illness is precipitated by stressful event. The degree of incapacity that he is experiencing as a consequence of his illness is mild. Additional symptoms of the illness do not include abdominal pain. He contemplates harming himself.     Review of patient's allergies indicates:  No Known Allergies  Past Medical History:   Diagnosis Date    IRAIS on CPAP      No past surgical history on file.  Family History   Problem Relation Age of Onset    Cancer Mother     Hypertension Mother     Hypertension Father     Diabetes Father     " Cancer Maternal Grandmother      Social History     Tobacco Use    Smoking status: Some Days     Current packs/day: 0.00     Types: Cigars   Substance Use Topics    Alcohol use: Yes    Drug use: Never     Review of Systems   Constitutional:  Negative for fever.   HENT:  Negative for sore throat.    Eyes:  Negative for visual disturbance.   Respiratory:  Negative for shortness of breath.    Cardiovascular:  Negative for chest pain.   Gastrointestinal:  Negative for abdominal pain.   Genitourinary:  Negative for dysuria.   Musculoskeletal:  Negative for joint swelling.   Skin:  Negative for rash.   Neurological:  Negative for weakness.   Psychiatric/Behavioral:  Positive for self-injury (thoughts). Negative for confusion.    All other systems reviewed and are negative.      Physical Exam     Initial Vitals [08/07/23 2308]   BP Pulse Resp Temp SpO2   (!) 141/88 (!) 116 20 98.4 °F (36.9 °C) 97 %      MAP       --         Physical Exam    Nursing note and vitals reviewed.  Constitutional: He appears well-developed and well-nourished. He is not diaphoretic. No distress.   HENT:   Head: Normocephalic and atraumatic.   Eyes: Conjunctivae and EOM are normal. Pupils are equal, round, and reactive to light.   Neck:   Normal range of motion.  Cardiovascular:  Normal rate, regular rhythm, normal heart sounds and intact distal pulses.           No murmur heard.  Pulmonary/Chest: Breath sounds normal. No respiratory distress. He has no wheezes. He has no rales.   Abdominal: Abdomen is soft. He exhibits no distension. There is no abdominal tenderness.   Musculoskeletal:         General: No tenderness or edema. Normal range of motion.      Cervical back: Normal range of motion.     Neurological: He is alert and oriented to person, place, and time. No cranial nerve deficit.   Skin: Skin is warm and dry. Capillary refill takes less than 2 seconds. No rash noted. No erythema.   Psychiatric: He does not express impulsivity or  inappropriate judgment. He exhibits a depressed mood. He expresses no homicidal and no suicidal ideation. He expresses no suicidal plans and no homicidal plans.         ED Course   Procedures  Labs Reviewed   COMPREHENSIVE METABOLIC PANEL - Abnormal; Notable for the following components:       Result Value    Glucose Level 161 (*)     All other components within normal limits   TSH - Abnormal; Notable for the following components:    Thyroid Stimulating Hormone 0.300 (*)     All other components within normal limits   URINALYSIS, REFLEX TO URINE CULTURE - Abnormal; Notable for the following components:    Ketones, UA Trace (*)     All other components within normal limits   DRUG SCREEN, URINE (BEAKER) - Abnormal; Notable for the following components:    Cannabinoids, Urine Positive (*)     All other components within normal limits    Narrative:     Cut off concentrations:    Amphetamines - 1000 ng/ml  Barbiturates - 200 ng/ml  Benzodiazepine - 200 ng/ml  Cannabinoids (THC) - 50 ng/ml  Cocaine - 300 ng/ml  Fentanyl - 1.0 ng/ml  MDMA - 500 ng/ml  Opiates - 300 ng/ml   Phencyclidine (PCP) - 25 ng/ml    Specimen submitted for drug analysis and tested for pH and specific gravity in order to evaluate sample integrity. Suspect tampering if specific gravity is <1.003 and/or pH is not within the range of 4.5 - 8.0  False negatives may result form substances such as bleach added to urine.  False positives may result for the presence of a substance with similar chemical structure to the drug or its metabolite.    This test provides only a PRELIMINARY analytical test result. A more specific alternate chemical method must be used in order to obtain a confirmed analytical result. Gas chromatography/mass spectrometry (GC/MS) is the preferred confirmatory method. Other chemical confirmation methods are available. Clinical consideration and professional judgement should be applied to any drug of abuse test result, particularly when  preliminary positive results are used.    Positive results will be confirmed only at the physicians request. Unconfirmed screening results are to be used only for medical purposes (treatment).        ACETAMINOPHEN LEVEL - Abnormal; Notable for the following components:    Acetaminophen Level <17.4 (*)     All other components within normal limits   CBC WITH DIFFERENTIAL - Abnormal; Notable for the following components:    WBC 12.35 (*)     Hgb 13.9 (*)     Hct 41.8 (*)     MPV 10.6 (*)     Neut # 10.75 (*)     All other components within normal limits   ALCOHOL,MEDICAL (ETHANOL) - Normal   SARS-COV-2 RNA AMPLIFICATION, QUAL - Normal    Narrative:     The IDNOW COVID-19 assay is a rapid molecular in vitro diagnostic test utilizing an isothermal nucleic acid amplification technology intended for the qualitative detection of nucleic acid from the SARS-CoV-2 viral RNA in direct nasal, nasopharyngeal or throat swabs from individuals who are suspected of COVID-19 by their healthcare provider.   URINALYSIS, MICROSCOPIC - Normal   T4, FREE - Normal   T3,FREE ([facility] ONLY) - Normal   CBC W/ AUTO DIFFERENTIAL    Narrative:     The following orders were created for panel order CBC auto differential.  Procedure                               Abnormality         Status                     ---------                               -----------         ------                     CBC with Differential[[id number]]        Abnormal            Final result                 Please view results for these tests on the individual orders.          Imaging Results    None          Medications - No data to display    Medical Decision Making:   History:   I obtained history from: someone other than patient.       <> Summary of History: Collateral history obtained from the patient's mother who is at bedside.  Patient does not have any access to firearms.  Old Medical Records: I decided to obtain old medical records.  Old Records Summarized: records from  previous admission(s), records from clinic visits and records from another hospital.       <> Summary of Records: Reviewed old records including recent visits from psychiatry.  Patient has a follow-up appointment on August 10th.  During the last admission he had his medications titrated up.  Initial Assessment:   Mental health evaluation  Differential Diagnosis:   Judging by the patient's chief complaint and pertinent history, the patient has the following possible differential diagnoses, including but not limited to the following.  Some of these are deemed to be lower likelihood and some more likely based on my physical exam and history combined with possible lab work and/or imaging studies.   Please see the pertinent studies, and refer to the HPI.  Some of these diagnoses will take further evaluation to fully rule out, perhaps as an outpatient and the patient was encouraged to follow up when discharged for more comprehensive evaluation.          suicidal ideations, homicidal ideations, auditory or visual hallucinations, drug/etoh intoxication, bipolar disorder, schizophrenia, schizoaffective, delusional disorder, major depressive disorder, PTSD, substance induced mood disorder, violent behavior, suicidal attempt, non-psychiatric medical illness, malingering      Clinical Tests:   Lab Tests: Ordered and Reviewed  ED Management:    Patient is a 37-year-old male presents with the mental evaluation.  See HPI.  See physical exam.  Patient has a longstanding history of severe depression, falls with a psychiatrist and counselor.  Last visit was few weeks ago and patient has follow-up appointment in 48 hours.  On initial evaluation patient denies any SI or HI.  Per triage she may comments that he has had worsening depression and has had some thoughts of harming himself in the past.  He denies any current SI or HI.  Collateral history obtained from the patient's mother.  Patient does not have any access to firearms.   Patient lives with mother.  Patient endorses multiple positive factors including needing to live for his family and specifically his mother.  Lab work as noted.  Consideration for possible pec however patient adamantly denies any SI, HI, collaborated by his mother and has adequate follow-up with psychiatrist within 48 hours.  I offered more urgent psychiatric evaluation in the form of pec given his history of depression.  Patient currently with rational thinking, stated multiple times he is not suicidal.  States he is having a tough time dealing with stressors.  Willing to see his psychiatrist as scheduled.  Discussed if any new or worsening symptoms, anymore intrusive thoughts, any thoughts of harming himself or anyone else return to the emergency department immediately.  Discussed if unable to follow-up with a psychiatrist return to the emergency department immediately.  Discussed strict return precautions with both the patient and mother.  Answered all questions at this time.  Does not meet criteria for legal hold at this time.  Hemodynamically stable for continued outpatient management strict return precautions.  Patient and mother verbalized understanding agreed to plan.          Scribe Attestation:   Scribe #1: I performed the above scribed service and the documentation accurately describes the services I performed. I attest to the accuracy of the note.    Attending Attestation:           Physician Attestation for Scribe:  Physician Attestation Statement for Scribe #1: I, Osmany Maynard MD, reviewed documentation, as scribed by Gerber Taylor in my presence, and it is both accurate and complete.         Medical Decision Making  Problems Addressed:  Depression, unspecified depression type: acute illness or injury that poses a threat to life or bodily functions    Amount and/or Complexity of Data Reviewed  Labs: ordered.    Risk  OTC drugs.  Prescription drug management.  Diagnosis or treatment  significantly limited by social determinants of health.                        Clinical Impression:   Final diagnoses:  [F32.A] Depression, unspecified depression type (Primary)        ED Disposition Condition    Discharge Stable          ED Prescriptions    None       Follow-up Information       Follow up With Specialties Details Why Contact Info    Uriah Donahue MD Psychiatry   [address] [place] [id number]  [phone]               Osmany Maynard MD  08/08/23 2048

## 2023-08-09 ENCOUNTER — OFFICE VISIT (OUTPATIENT)
Dept: BEHAVIORAL HEALTH | Facility: CLINIC | Age: 38
End: 2023-08-09
Payer: COMMERCIAL

## 2023-08-09 VITALS
TEMPERATURE: 98 F | BODY MASS INDEX: 31.82 KG/M2 | HEART RATE: 82 BPM | DIASTOLIC BLOOD PRESSURE: 83 MMHG | SYSTOLIC BLOOD PRESSURE: 125 MMHG | WEIGHT: 247.81 LBS | OXYGEN SATURATION: 100 %

## 2023-08-09 DIAGNOSIS — F41.9 ANXIETY: ICD-10-CM

## 2023-08-09 DIAGNOSIS — F33.2 SEVERE EPISODE OF RECURRENT MAJOR DEPRESSIVE DISORDER, WITHOUT PSYCHOTIC FEATURES: Primary | ICD-10-CM

## 2023-08-09 PROCEDURE — 99214 OFFICE O/P EST MOD 30 MIN: CPT | Mod: S$PBB,,, | Performed by: STUDENT IN AN ORGANIZED HEALTH CARE EDUCATION/TRAINING PROGRAM

## 2023-08-09 PROCEDURE — 3079F PR MOST RECENT DIASTOLIC BLOOD PRESSURE 80-89 MM HG: ICD-10-PCS | Mod: CPTII,,, | Performed by: STUDENT IN AN ORGANIZED HEALTH CARE EDUCATION/TRAINING PROGRAM

## 2023-08-09 PROCEDURE — 99214 PR OFFICE/OUTPT VISIT, EST, LEVL IV, 30-39 MIN: ICD-10-PCS | Mod: S$PBB,,, | Performed by: STUDENT IN AN ORGANIZED HEALTH CARE EDUCATION/TRAINING PROGRAM

## 2023-08-09 PROCEDURE — 1159F PR MEDICATION LIST DOCUMENTED IN MEDICAL RECORD: ICD-10-PCS | Mod: CPTII,,, | Performed by: STUDENT IN AN ORGANIZED HEALTH CARE EDUCATION/TRAINING PROGRAM

## 2023-08-09 PROCEDURE — 3074F SYST BP LT 130 MM HG: CPT | Mod: CPTII,,, | Performed by: STUDENT IN AN ORGANIZED HEALTH CARE EDUCATION/TRAINING PROGRAM

## 2023-08-09 PROCEDURE — 3008F BODY MASS INDEX DOCD: CPT | Mod: CPTII,,, | Performed by: STUDENT IN AN ORGANIZED HEALTH CARE EDUCATION/TRAINING PROGRAM

## 2023-08-09 PROCEDURE — 3008F PR BODY MASS INDEX (BMI) DOCUMENTED: ICD-10-PCS | Mod: CPTII,,, | Performed by: STUDENT IN AN ORGANIZED HEALTH CARE EDUCATION/TRAINING PROGRAM

## 2023-08-09 PROCEDURE — 3074F PR MOST RECENT SYSTOLIC BLOOD PRESSURE < 130 MM HG: ICD-10-PCS | Mod: CPTII,,, | Performed by: STUDENT IN AN ORGANIZED HEALTH CARE EDUCATION/TRAINING PROGRAM

## 2023-08-09 PROCEDURE — 3079F DIAST BP 80-89 MM HG: CPT | Mod: CPTII,,, | Performed by: STUDENT IN AN ORGANIZED HEALTH CARE EDUCATION/TRAINING PROGRAM

## 2023-08-09 PROCEDURE — 1159F MED LIST DOCD IN RCRD: CPT | Mod: CPTII,,, | Performed by: STUDENT IN AN ORGANIZED HEALTH CARE EDUCATION/TRAINING PROGRAM

## 2023-08-09 PROCEDURE — 1160F RVW MEDS BY RX/DR IN RCRD: CPT | Mod: CPTII,,, | Performed by: STUDENT IN AN ORGANIZED HEALTH CARE EDUCATION/TRAINING PROGRAM

## 2023-08-09 PROCEDURE — 99213 OFFICE O/P EST LOW 20 MIN: CPT | Mod: PBBFAC,PN | Performed by: STUDENT IN AN ORGANIZED HEALTH CARE EDUCATION/TRAINING PROGRAM

## 2023-08-09 PROCEDURE — 1160F PR REVIEW ALL MEDS BY PRESCRIBER/CLIN PHARMACIST DOCUMENTED: ICD-10-PCS | Mod: CPTII,,, | Performed by: STUDENT IN AN ORGANIZED HEALTH CARE EDUCATION/TRAINING PROGRAM

## 2023-08-09 RX ORDER — ARIPIPRAZOLE 5 MG/1
5 TABLET ORAL DAILY
Qty: 30 TABLET | Refills: 5 | Status: SHIPPED | OUTPATIENT
Start: 2023-08-09 | End: 2023-11-01 | Stop reason: ALTCHOICE

## 2023-08-09 NOTE — PROGRESS NOTES
"Outpatient Psychiatry Follow-Up Visit    8/9/2023    Clinical Status of Patient:  Outpatient (Ambulatory)    Chief Complaint:  Sandy Evans is a 37 y.o. male who presents today for follow-up of depression and anxiety. Patient last seen for follow-up on 7/27/2023. Met with patient.      Interval History and Content of Current Session:  Interim Events/Subjective Report/Content of Current Session:   Pt reports doing "ok"  overall.  Notes having severe panic attacks on 8/7/2023, went to ED for this.  Denies history of panic attacks.  Has been trying seroquel, notes SE of daytime grogginess, slight benefit noted but SE outweight benefits.  Reports severely depressed mood, elevated anxiety.  Sleep poor. Appetite unchanged, weight stable.  Energy low, motivation poor.  Endorses irritability, improved hopelessness.  Denies SI/HI/AVH/paranoia, denies plan or desire for self harm or harm to others.  Denies change in chronic somatic complaints. Pt voices desire to adjust regimen to address ongoing symptoms.      Psychiatric Review of Systems-is patient experiencing or having changes in  Anxiety: elevated  Sleep: poor  Appetite: imcjamged  Weight: stable  Energy: poor  Concentration: poor  Libido: no problem voiced  Irritability: endorses  Motivation: poor  Guilt/hopelessness: endorses  Paranoia/delusions: denies  SIB/risky behavior: denies    Review of Systems   PSYCHIATRIC: Pertinant items are noted in the narrative.  CONSTITUTIONAL: No weight gain or loss.  MUSCULOSKELETAL: No pain or stiffness of the joints.  NEUROLOGIC: No weakness, sensory changes, seizures, confusion, memory loss, tremor or other abnormal movements.  CARDIAC: No CP, no palpitations  RESPIRATORY: No shortness of breath.  CARDIOVASCULAR: No tachycardia or chest pain.  GASTROINTESTINAL: No nausea, vomiting, pain, constipation or diarrhea.    Past Medical, Family and Social History: The patient's past medical, family and social history have been " "reviewed and updated as appropriate within the electronic medical record - see encounter notes.    Compliance: good    Side effects: grogginess from seroquel    Risk Parameters:  Patient reports no suicidal ideation  Patient reports no homicidal ideation  Patient reports no self-injurious behavior  Patient reports no violent behavior    Exam (detailed: at least 9 elements; comprehensive: all 15 elements)   Constitutional  Vitals:  Most recent vital signs, dated less than 90 days prior to this appointment, were reviewed.     Vitals:    08/09/23 1108   BP: 125/83   Pulse: 82   Temp: 98 °F (36.7 °C)   SpO2: 100%   Weight: 112.4 kg (247 lb 12.8 oz)          General:   Constitutional:appears stated age, no acute distress    Neurologic:   Motor: moves all extremities spontaneously and without difficulty  Gait: normal gait and station    Mental status examination:   Appearance: appears stated age, casually dressed  Behavior: more interactive, appropriate for setting  Mood: "down"  Affect: mood congruent, constricted, brighter  Thought process: ruminative  Associations: appropriate for conversation  Thought content: no plan or desire for self harm or harm to others, denies paranoia, no delusional ideation volunteered  Perceptions: denies hallucinations or other altered perceptions  Orientation: oriented to day of week, month, year, location, and situation  Language: English, fluid  Attention: able to attend to interview  Insight: good  Judgement: good    PHQ9 8/9/2023   Total Score 19     GAD7 8/9/2023 7/27/2023 6/9/2023   1. Feeling nervous, anxious, or on edge? 3 2 0   2. Not being able to stop or control worrying? 3 2 1   3. Worrying too much about different things? 3 3 1   4. Trouble relaxing? 3 3 0   5. Being so restless that it is hard to sit still? 3 2 0   6. Becoming easily annoyed or irritable? 3 1 2   7. Feeling afraid as if something awful might happen? 3 1 0   8. If you checked off any problems, how difficult " have these problems made it for you to do your work, take care of things at home, or get along with other people? 3 1 1   SUJEY-7 Score 21 14 4     Assessment and Diagnosis   Status/Progress: Based on the examination today, the patient's problem(s) is/are inadequately controlled.  New problems have not been presented today.   Co-morbidities and Lack of compliance are not complicating management of the primary condition.  Number of separate conditions addressed during today's visit: 2 (depression inadequately controlled, anxiety inadequately controlled).  Are medication adjustments being made today: Yes.  Are referral(s) being ordered today: No.  Complexity (level) of medical decision making employed in the encounter: HIGH.    General Impression:    ICD-10-CM ICD-9-CM   1. Severe episode of recurrent major depressive disorder, without psychotic features  F33.2 296.33   2. Anxiety  F41.9 300.00     Intervention/Counseling/Treatment Plan   Continue effexor XR 150mg daily  Discontinue seroquel 2/2 SE  Start abilify 5mg daily for depression augmentation (consider latuda vs vraylar if abilify not helpful), Discussed potential SE including but not limited to sedation, metabolic disturbance, weight gain, movement disorder (including TD)  Continue xanax prn (pt taking 1-2x daily)  Continue lunesta 3mg daily  Recommend pt continue with current therapist  Consider IOP, will follow up with patient closely  Consider referral for ketamine  AIMS 0 today  Will obtain hba1c and FLP  No need for PEC as pt is not an imminent danger to self or others or gravely disabled due to acute psychiatric illness  Discussed that pt should either call clinic for psychiatric crisis symptoms or present to nearest emergency room    Discussed with patient informed consent including diagnosis, risks and benefits of proposed treatment above vs. alternative treatments vs. no treatment, as well as serious and common side effects of these treatments, and the  inherent unpredictability of individual responses to these treatments. The patient expresses understanding of the above and displays the capacity to agree with this current plan. Patient also agrees that, currently, the benefits outweigh the risks and would like to pursue treatment at this time, and had no other questions.    Instructions:  Take all medications as prescribed.    Abstain from recreational drugs and alcohol.  Present to ED or call 911 for SI/HI plan or intent, psychosis, or medical emergency.    Return to Clinic: Follow up in about 3 weeks (around 8/30/2023).    Total time:   The total time for services performed on the date of the encounter (including review of prior visit notes, review of notes from other providers, review of results from laboratory/imaging studies, face-to-face time with patient, and time spent on other activities directly related to patient care): 25 minutes.    Uriah Donahue MD  [facility]

## 2023-08-17 ENCOUNTER — TELEPHONE (OUTPATIENT)
Dept: FAMILY MEDICINE | Facility: CLINIC | Age: 38
End: 2023-08-17
Payer: COMMERCIAL

## 2023-08-17 DIAGNOSIS — F41.1 GAD (GENERALIZED ANXIETY DISORDER): Primary | ICD-10-CM

## 2023-08-17 RX ORDER — ALPRAZOLAM 2 MG/1
2 TABLET ORAL 3 TIMES DAILY
Qty: 90 TABLET | Refills: 1 | Status: SHIPPED | OUTPATIENT
Start: 2023-08-17 | End: 2024-02-01 | Stop reason: SDUPTHER

## 2023-08-17 NOTE — TELEPHONE ENCOUNTER
Pt called to report he was out of his xanax 2mg tablets and that ordinary prescriber is out of the office.  PDMP reviewed and demonstrated no abnormal filling patterns.  Sent in Rx for xanax 2mg tid to pt's pharmacy, will discuss with patient at his next visit.

## 2023-09-19 ENCOUNTER — OFFICE VISIT (OUTPATIENT)
Dept: BEHAVIORAL HEALTH | Facility: CLINIC | Age: 38
End: 2023-09-19
Payer: COMMERCIAL

## 2023-09-19 VITALS
OXYGEN SATURATION: 99 % | SYSTOLIC BLOOD PRESSURE: 131 MMHG | TEMPERATURE: 98 F | WEIGHT: 259.38 LBS | HEART RATE: 83 BPM | DIASTOLIC BLOOD PRESSURE: 83 MMHG | BODY MASS INDEX: 33.3 KG/M2

## 2023-09-19 DIAGNOSIS — F41.9 ANXIETY: ICD-10-CM

## 2023-09-19 DIAGNOSIS — F33.2 SEVERE EPISODE OF RECURRENT MAJOR DEPRESSIVE DISORDER, WITHOUT PSYCHOTIC FEATURES: Primary | ICD-10-CM

## 2023-09-19 PROCEDURE — 99213 OFFICE O/P EST LOW 20 MIN: CPT | Mod: S$PBB,,, | Performed by: STUDENT IN AN ORGANIZED HEALTH CARE EDUCATION/TRAINING PROGRAM

## 2023-09-19 PROCEDURE — 1160F RVW MEDS BY RX/DR IN RCRD: CPT | Mod: CPTII,,, | Performed by: STUDENT IN AN ORGANIZED HEALTH CARE EDUCATION/TRAINING PROGRAM

## 2023-09-19 PROCEDURE — 3008F PR BODY MASS INDEX (BMI) DOCUMENTED: ICD-10-PCS | Mod: CPTII,,, | Performed by: STUDENT IN AN ORGANIZED HEALTH CARE EDUCATION/TRAINING PROGRAM

## 2023-09-19 PROCEDURE — 3075F PR MOST RECENT SYSTOLIC BLOOD PRESS GE 130-139MM HG: ICD-10-PCS | Mod: CPTII,,, | Performed by: STUDENT IN AN ORGANIZED HEALTH CARE EDUCATION/TRAINING PROGRAM

## 2023-09-19 PROCEDURE — 99213 PR OFFICE/OUTPT VISIT, EST, LEVL III, 20-29 MIN: ICD-10-PCS | Mod: S$PBB,,, | Performed by: STUDENT IN AN ORGANIZED HEALTH CARE EDUCATION/TRAINING PROGRAM

## 2023-09-19 PROCEDURE — 1159F PR MEDICATION LIST DOCUMENTED IN MEDICAL RECORD: ICD-10-PCS | Mod: CPTII,,, | Performed by: STUDENT IN AN ORGANIZED HEALTH CARE EDUCATION/TRAINING PROGRAM

## 2023-09-19 PROCEDURE — 3008F BODY MASS INDEX DOCD: CPT | Mod: CPTII,,, | Performed by: STUDENT IN AN ORGANIZED HEALTH CARE EDUCATION/TRAINING PROGRAM

## 2023-09-19 PROCEDURE — 99213 OFFICE O/P EST LOW 20 MIN: CPT | Mod: PBBFAC,PN | Performed by: STUDENT IN AN ORGANIZED HEALTH CARE EDUCATION/TRAINING PROGRAM

## 2023-09-19 PROCEDURE — 3075F SYST BP GE 130 - 139MM HG: CPT | Mod: CPTII,,, | Performed by: STUDENT IN AN ORGANIZED HEALTH CARE EDUCATION/TRAINING PROGRAM

## 2023-09-19 PROCEDURE — 3079F DIAST BP 80-89 MM HG: CPT | Mod: CPTII,,, | Performed by: STUDENT IN AN ORGANIZED HEALTH CARE EDUCATION/TRAINING PROGRAM

## 2023-09-19 PROCEDURE — 1160F PR REVIEW ALL MEDS BY PRESCRIBER/CLIN PHARMACIST DOCUMENTED: ICD-10-PCS | Mod: CPTII,,, | Performed by: STUDENT IN AN ORGANIZED HEALTH CARE EDUCATION/TRAINING PROGRAM

## 2023-09-19 PROCEDURE — 3079F PR MOST RECENT DIASTOLIC BLOOD PRESSURE 80-89 MM HG: ICD-10-PCS | Mod: CPTII,,, | Performed by: STUDENT IN AN ORGANIZED HEALTH CARE EDUCATION/TRAINING PROGRAM

## 2023-09-19 PROCEDURE — 1159F MED LIST DOCD IN RCRD: CPT | Mod: CPTII,,, | Performed by: STUDENT IN AN ORGANIZED HEALTH CARE EDUCATION/TRAINING PROGRAM

## 2023-09-19 NOTE — PROGRESS NOTES
"Outpatient Psychiatry Follow-Up Visit    9/19/2023    Clinical Status of Patient:  Outpatient (Ambulatory)    Chief Complaint:  Sandy Evans is a 37 y.o. male who presents today for follow-up of depression and anxiety. Patient last seen for follow-up on 8/9/2023. Met with patient.      Interval History and Content of Current Session:  Interim Events/Subjective Report/Content of Current Session:   Pt reports doing "alright"  overall.  Reports unchanged mood, worsening anxiety.  Sleep poor. Appetite low, weight stable.  Energy low, motivation poor.  Some irritability, endorses hopelessness.  +Vague, fleeting SI.  Denies HI/AVH/paranoia, denies plan or desire for self harm or harm to others.  Reports SE from current regimen: decreased libido. Denies change in chronic somatic complaints. Pt voices desire to adjust regimen to address ongoing symptoms.      Psychiatric Review of Systems-is patient experiencing or having changes in  Anxiety: elevated  Sleep: poor  Appetite: poor  Weight: stable  Energy: poor  Concentration: poor  Libido: no problem voiced  Irritability: endorses  Motivation: poor  Guilt/hopelessness: endorses  Paranoia/delusions: denies  SIB/risky behavior: denies    Review of Systems   PSYCHIATRIC: Pertinant items are noted in the narrative.  CONSTITUTIONAL: No weight gain or loss.  MUSCULOSKELETAL: No pain or stiffness of the joints.  NEUROLOGIC: No weakness, sensory changes, seizures, confusion, memory loss, tremor or other abnormal movements.  CARDIAC: No CP, no palpitations  RESPIRATORY: No shortness of breath.  CARDIOVASCULAR: No tachycardia or chest pain.  GASTROINTESTINAL: No nausea, vomiting, pain, constipation or diarrhea.    Past Medical, Family and Social History: The patient's past medical, family and social history have been reviewed and updated as appropriate within the electronic medical record - see encounter notes.    Compliance: good    Side effects: low libido from abilify    Risk " "Parameters:  Patient reports no suicidal ideation  Patient reports no homicidal ideation  Patient reports no self-injurious behavior  Patient reports no violent behavior    Exam (detailed: at least 9 elements; comprehensive: all 15 elements)   Constitutional  Vitals:  Most recent vital signs, dated less than 90 days prior to this appointment, were reviewed.     Vitals:    09/19/23 1504   BP: 131/83   Pulse: 83   Temp: 98 °F (36.7 °C)   SpO2: 99%   Weight: 117.7 kg (259 lb 6.4 oz)        General:   Constitutional:appears stated age, no acute distress    Neurologic:   Motor: moves all extremities spontaneously and without difficulty  Gait: normal gait and station    Mental status examination:   Appearance: appears stated age, casually dressed  Behavior: more interactive, appropriate for setting  Mood: "low"  Affect: mood congruent, constricted  Thought process: ruminative  Associations: appropriate for conversation  Thought content: no plan or desire for self harm or harm to others, denies paranoia, no delusional ideation volunteered  Perceptions: denies hallucinations or other altered perceptions  Orientation: oriented to day of week, month, year, location, and situation  Language: English, fluid  Attention: able to attend to interview  Insight: good  Judgement: good    PHQ9:  Over the last two weeks how often have you been bothered by little interest or pleasure in doing things: 3  Over the last two weeks how often have you been bothered by feeling down, depressed or hopeless: 3  PHQ-2 Total Score: 6  PHQ-9 Score: 24  PHQ-9 Interpretation: Severe        9/19/2023     3:02 PM 8/9/2023    11:08 AM 7/27/2023     3:38 PM   GAD7   1. Feeling nervous, anxious, or on edge? 3 3 2   2. Not being able to stop or control worrying? 3 3 2   3. Worrying too much about different things? 3 3 3   4. Trouble relaxing? 3 3 3   5. Being so restless that it is hard to sit still? 1 3 2   6. Becoming easily annoyed or irritable? 1 3 1 "   7. Feeling afraid as if something awful might happen? 3 3 1   8. If you checked off any problems, how difficult have these problems made it for you to do your work, take care of things at home, or get along with other people? 2 3 1   SUJEY-7 Score 17 21 14     Assessment and Diagnosis   Status/Progress: Based on the examination today, the patient's problem(s) is/are inadequately controlled.  New problems have not been presented today.   SE from treatment are complicating management of the primary condition.  Number of separate conditions addressed during today's visit: 2 (mood poorly controlled, anxiety poorly controlled).  Are medication adjustments being made today: No.  Are referral(s) being ordered today: Yes.  Complexity (level) of medical decision making employed in the encounter: HIGH.    General Impression:    ICD-10-CM ICD-9-CM   1. Severe episode of recurrent major depressive disorder, without psychotic features  F33.2 296.33   2. Anxiety  F41.9 300.00       Intervention/Counseling/Treatment Plan   Continue effexor XR 150mg daily  Discontinue seroquel 2/2 SE  Continue abilify for now, will discontinue (due to SE) when pt starts esketamine treatments   Continue xanax prn (pt taking 1-2x daily)  Continue lunesta 3mg daily  Recommend pt continue with current therapist  Will refer for esketamine treatments, discussed overall goal of treatment and SE potential  AIMS 0 today  No need for PEC as pt is not an imminent danger to self or others or gravely disabled due to acute psychiatric illness  Discussed that pt should either call clinic for psychiatric crisis symptoms or present to nearest emergency room    Discussed with patient informed consent including diagnosis, risks and benefits of proposed treatment above vs. alternative treatments vs. no treatment, as well as serious and common side effects of these treatments, and the inherent unpredictability of individual responses to these treatments. The patient  expresses understanding of the above and displays the capacity to agree with this current plan. Patient also agrees that, currently, the benefits outweigh the risks and would like to pursue treatment at this time, and had no other questions.    Instructions:  Take all medications as prescribed.    Abstain from recreational drugs and alcohol.  Present to ED or call 911 for SI/HI plan or intent, psychosis, or medical emergency.    Return to Clinic: Follow up in about 4 weeks (around 10/17/2023).    Total time:   The total time for services performed on the date of the encounter (including review of prior visit notes, review of notes from other providers, review of results from laboratory/imaging studies, face-to-face time with patient, and time spent on other activities directly related to patient care): 25 minutes.    Uriah Donahue MD  [facility]

## 2023-09-22 ENCOUNTER — TELEPHONE (OUTPATIENT)
Dept: FAMILY MEDICINE | Facility: CLINIC | Age: 38
End: 2023-09-22
Payer: COMMERCIAL

## 2023-09-22 NOTE — TELEPHONE ENCOUNTER
Spoke with pt and informed him that we have not yet received any documentation. All questions answered.

## 2023-09-26 ENCOUNTER — TELEPHONE (OUTPATIENT)
Dept: FAMILY MEDICINE | Facility: CLINIC | Age: 38
End: 2023-09-26
Payer: COMMERCIAL

## 2023-09-27 NOTE — TELEPHONE ENCOUNTER
Returned pts phone call; lvm informing pt that all documentation has been successfully faxed to [facility]

## 2023-10-05 DIAGNOSIS — F33.2 SEVERE EPISODE OF RECURRENT MAJOR DEPRESSIVE DISORDER, WITHOUT PSYCHOTIC FEATURES: ICD-10-CM

## 2023-10-05 DIAGNOSIS — F41.9 ANXIETY: ICD-10-CM

## 2023-10-05 RX ORDER — VENLAFAXINE HYDROCHLORIDE 150 MG/1
150 CAPSULE, EXTENDED RELEASE ORAL DAILY
Qty: 30 CAPSULE | Refills: 2 | Status: SHIPPED | OUTPATIENT
Start: 2023-10-05 | End: 2024-01-19 | Stop reason: SDUPTHER

## 2023-11-01 ENCOUNTER — OFFICE VISIT (OUTPATIENT)
Dept: BEHAVIORAL HEALTH | Facility: CLINIC | Age: 38
End: 2023-11-01
Payer: COMMERCIAL

## 2023-11-01 VITALS
HEART RATE: 80 BPM | TEMPERATURE: 98 F | OXYGEN SATURATION: 100 % | DIASTOLIC BLOOD PRESSURE: 87 MMHG | WEIGHT: 254.5 LBS | BODY MASS INDEX: 32.68 KG/M2 | SYSTOLIC BLOOD PRESSURE: 125 MMHG

## 2023-11-01 DIAGNOSIS — F33.41 RECURRENT MAJOR DEPRESSIVE DISORDER, IN PARTIAL REMISSION: Primary | ICD-10-CM

## 2023-11-01 PROCEDURE — 1160F PR REVIEW ALL MEDS BY PRESCRIBER/CLIN PHARMACIST DOCUMENTED: ICD-10-PCS | Mod: CPTII,,, | Performed by: STUDENT IN AN ORGANIZED HEALTH CARE EDUCATION/TRAINING PROGRAM

## 2023-11-01 PROCEDURE — 99213 OFFICE O/P EST LOW 20 MIN: CPT | Mod: S$PBB,,, | Performed by: STUDENT IN AN ORGANIZED HEALTH CARE EDUCATION/TRAINING PROGRAM

## 2023-11-01 PROCEDURE — 3079F PR MOST RECENT DIASTOLIC BLOOD PRESSURE 80-89 MM HG: ICD-10-PCS | Mod: CPTII,,, | Performed by: STUDENT IN AN ORGANIZED HEALTH CARE EDUCATION/TRAINING PROGRAM

## 2023-11-01 PROCEDURE — 1159F PR MEDICATION LIST DOCUMENTED IN MEDICAL RECORD: ICD-10-PCS | Mod: CPTII,,, | Performed by: STUDENT IN AN ORGANIZED HEALTH CARE EDUCATION/TRAINING PROGRAM

## 2023-11-01 PROCEDURE — 99213 OFFICE O/P EST LOW 20 MIN: CPT | Mod: PBBFAC,PN | Performed by: STUDENT IN AN ORGANIZED HEALTH CARE EDUCATION/TRAINING PROGRAM

## 2023-11-01 PROCEDURE — 3008F BODY MASS INDEX DOCD: CPT | Mod: CPTII,,, | Performed by: STUDENT IN AN ORGANIZED HEALTH CARE EDUCATION/TRAINING PROGRAM

## 2023-11-01 PROCEDURE — 3008F PR BODY MASS INDEX (BMI) DOCUMENTED: ICD-10-PCS | Mod: CPTII,,, | Performed by: STUDENT IN AN ORGANIZED HEALTH CARE EDUCATION/TRAINING PROGRAM

## 2023-11-01 PROCEDURE — 1159F MED LIST DOCD IN RCRD: CPT | Mod: CPTII,,, | Performed by: STUDENT IN AN ORGANIZED HEALTH CARE EDUCATION/TRAINING PROGRAM

## 2023-11-01 PROCEDURE — 3079F DIAST BP 80-89 MM HG: CPT | Mod: CPTII,,, | Performed by: STUDENT IN AN ORGANIZED HEALTH CARE EDUCATION/TRAINING PROGRAM

## 2023-11-01 PROCEDURE — 1160F RVW MEDS BY RX/DR IN RCRD: CPT | Mod: CPTII,,, | Performed by: STUDENT IN AN ORGANIZED HEALTH CARE EDUCATION/TRAINING PROGRAM

## 2023-11-01 PROCEDURE — 99213 PR OFFICE/OUTPT VISIT, EST, LEVL III, 20-29 MIN: ICD-10-PCS | Mod: S$PBB,,, | Performed by: STUDENT IN AN ORGANIZED HEALTH CARE EDUCATION/TRAINING PROGRAM

## 2023-11-01 PROCEDURE — 3074F SYST BP LT 130 MM HG: CPT | Mod: CPTII,,, | Performed by: STUDENT IN AN ORGANIZED HEALTH CARE EDUCATION/TRAINING PROGRAM

## 2023-11-01 PROCEDURE — 3074F PR MOST RECENT SYSTOLIC BLOOD PRESSURE < 130 MM HG: ICD-10-PCS | Mod: CPTII,,, | Performed by: STUDENT IN AN ORGANIZED HEALTH CARE EDUCATION/TRAINING PROGRAM

## 2023-11-01 RX ORDER — ONDANSETRON 4 MG/1
4 TABLET, FILM COATED ORAL EVERY 8 HOURS PRN
COMMUNITY
Start: 2023-10-02

## 2023-11-01 RX ORDER — ESKETAMINE HYDROCHLORIDE 28 MG/.2ML
SOLUTION NASAL
COMMUNITY
Start: 2023-10-30

## 2023-11-01 NOTE — PROGRESS NOTES
"Outpatient Psychiatry Follow-Up Visit    11/1/2023    Clinical Status of Patient:  Outpatient (Ambulatory)    Chief Complaint:  Sandy Evans is a 38 y.o. male who presents today for follow-up of depression and anxiety. Patient last seen for follow-up on 9/19/2023. Met with patient.      Interval History and Content of Current Session:  Interim Events/Subjective Report/Content of Current Session:   Pt reports doing "much better"  overall.  Notes that he started ketamine treatments about 1.5 weeks ago and reports very good response to this.  Last treatment was on Monday.  Notes that he feels sleepy after receiving treatments but overall is tolerating treatments well.  Reports improved mood, improved anxiety.  Sleep good. Energy good, motivation good.  Improved irritability, improved hopelessness.  Notes suicidal ideation is vastly improved, now only having rare, fleeting SI that is easy to manage.  Denies HI/AVH/paranoia, denies plan or desire for self harm or harm to others.  Denies SE from current regimen. Denies somatic complaints.   Pt happy with current regimen and wants to continue.     Psychiatric Review of Systems-is patient experiencing or having changes in  Anxiety: improved  Sleep: improved  Appetite: good  Weight: stable  Energy: improved  Concentration: improved  Libido: no problem voiced  Irritability: improved  Motivation: denies  Guilt/hopelessness: occasional  Paranoia/delusions: denies  SIB/risky behavior: denies    Review of Systems   PSYCHIATRIC: Pertinant items are noted in the narrative.  CONSTITUTIONAL: No weight gain or loss.  MUSCULOSKELETAL: No pain or stiffness of the joints.  NEUROLOGIC: No weakness, sensory changes, seizures, confusion, memory loss, tremor or other abnormal movements.  CARDIAC: No CP, no palpitations  RESPIRATORY: No shortness of breath.  CARDIOVASCULAR: No tachycardia or chest pain.  GASTROINTESTINAL: No nausea, vomiting, pain, constipation or diarrhea.    Past " "Medical, Family and Social History: The patient's past medical, family and social history have been reviewed and updated as appropriate within the electronic medical record - see encounter notes.    Compliance: good    Side effects: low libido from abilify    Risk Parameters:  Patient reports suicidal ideation: fleeting, much improved  Patient reports no homicidal ideation  Patient reports no self-injurious behavior  Patient reports no violent behavior    Exam (detailed: at least 9 elements; comprehensive: all 15 elements)   Constitutional  Vitals:  Most recent vital signs, dated less than 90 days prior to this appointment, were reviewed.     Vitals:    11/01/23 1242   BP: 125/87   Pulse: 80   Temp: 98.3 °F (36.8 °C)   SpO2: 100%   Weight: 115.4 kg (254 lb 8 oz)        General:   Constitutional:appears stated age, no acute distress    Neurologic:   Motor: moves all extremities spontaneously and without difficulty  Gait: normal gait and station    Mental status examination:   Appearance: appears stated age, casually dressed  Behavior: much more interactive, appropriate for setting  Mood: "good"  Affect: mood congruent, much brighter  Thought process: linear, goal directed  Associations: appropriate for conversation  Thought content: no plan or desire for self harm or harm to others, denies paranoia, no delusional ideation volunteered  Perceptions: denies hallucinations or other altered perceptions  Orientation: oriented to day of week, month, year, location, and situation  Language: English, fluid  Attention: able to attend to interview  Insight: good  Judgement: good    PHQ9:  Over the last two weeks how often have you been bothered by little interest or pleasure in doing things: 1  Over the last two weeks how often have you been bothered by feeling down, depressed or hopeless: 1  PHQ-2 Total Score: 2  PHQ-9 Score: 8  PHQ-9 Interpretation: Mild        11/1/2023    12:41 PM 9/19/2023     3:02 PM 8/9/2023    11:08 AM "   GAD7   1. Feeling nervous, anxious, or on edge? 1 3 3   2. Not being able to stop or control worrying? 1 3 3   3. Worrying too much about different things? 1 3 3   4. Trouble relaxing? 1 3 3   5. Being so restless that it is hard to sit still? 1 1 3   6. Becoming easily annoyed or irritable? 1 1 3   7. Feeling afraid as if something awful might happen? 0 3 3   8. If you checked off any problems, how difficult have these problems made it for you to do your work, take care of things at home, or get along with other people? 1 2 3   SUJEY-7 Score 6 17 21     Assessment and Diagnosis   Status/Progress: Based on the examination today, the patient's problem(s) is/are improved.  New problems have not been presented today.   Co-morbidities and Lack of compliance are not complicating management of the primary condition.  Number of separate conditions addressed during today's visit: 1 (depression improved).  Are medication adjustments being made today: yes.  Are referral(s) being ordered today: no.  Complexity (level) of medical decision making employed in the encounter: HIGH.    General Impression:    ICD-10-CM ICD-9-CM   1. Recurrent major depressive disorder, in partial remission  F33.41 296.35     Intervention/Counseling/Treatment Plan   Continue effexor XR 150mg daily  Discontinue abilify due to lack of benefit  Continue xanax prn (pt taking 1-2x daily)  Continue lunesta 3mg daily  Continue esketamine, defer to outside provider  Recommend time off from work for esketamine initiation course, likely return to work afterwards (target date 12/4/2023)  Recommend pt continue with current therapist  No need for PEC as pt is not an imminent danger to self or others or gravely disabled due to acute psychiatric illness  Discussed that pt should either call clinic for psychiatric crisis symptoms or present to nearest emergency room    Discussed with patient informed consent including diagnosis, risks and benefits of proposed  treatment above vs. alternative treatments vs. no treatment, as well as serious and common side effects of these treatments, and the inherent unpredictability of individual responses to these treatments. The patient expresses understanding of the above and displays the capacity to agree with this current plan. Patient also agrees that, currently, the benefits outweigh the risks and would like to pursue treatment at this time, and had no other questions.    Instructions:  Take all medications as prescribed.    Abstain from recreational drugs and alcohol.  Present to ED or call 911 for SI/HI plan or intent, psychosis, or medical emergency.    Return to Clinic: Follow up in about 4 weeks (around 11/29/2023).    Total time:   The total time for services performed on the date of the encounter (including review of prior visit notes, review of notes from other providers, review of results from laboratory/imaging studies, face-to-face time with patient, and time spent on other activities directly related to patient care): 20 minutes.    Uriah Donahue MD  [facility]

## 2023-11-16 ENCOUNTER — TELEPHONE (OUTPATIENT)
Dept: FAMILY MEDICINE | Facility: CLINIC | Age: 38
End: 2023-11-16
Payer: COMMERCIAL

## 2023-11-17 NOTE — TELEPHONE ENCOUNTER
Spoke to patient, Informed him that we just received the paperwork from [employer] yesterday evening 11/16/23 and I will placing it on Dr. Donahue's desk this morning.

## 2023-11-27 ENCOUNTER — OFFICE VISIT (OUTPATIENT)
Dept: BEHAVIORAL HEALTH | Facility: CLINIC | Age: 38
End: 2023-11-27
Payer: COMMERCIAL

## 2023-11-27 VITALS
BODY MASS INDEX: 32.43 KG/M2 | OXYGEN SATURATION: 100 % | TEMPERATURE: 99 F | DIASTOLIC BLOOD PRESSURE: 85 MMHG | WEIGHT: 252.63 LBS | SYSTOLIC BLOOD PRESSURE: 138 MMHG | HEART RATE: 80 BPM

## 2023-11-27 DIAGNOSIS — F33.41 RECURRENT MAJOR DEPRESSIVE DISORDER, IN PARTIAL REMISSION: Primary | ICD-10-CM

## 2023-11-27 DIAGNOSIS — F41.9 ANXIETY: ICD-10-CM

## 2023-11-27 PROCEDURE — 3075F SYST BP GE 130 - 139MM HG: CPT | Mod: CPTII,,, | Performed by: STUDENT IN AN ORGANIZED HEALTH CARE EDUCATION/TRAINING PROGRAM

## 2023-11-27 PROCEDURE — 3075F PR MOST RECENT SYSTOLIC BLOOD PRESS GE 130-139MM HG: ICD-10-PCS | Mod: CPTII,,, | Performed by: STUDENT IN AN ORGANIZED HEALTH CARE EDUCATION/TRAINING PROGRAM

## 2023-11-27 PROCEDURE — 1159F PR MEDICATION LIST DOCUMENTED IN MEDICAL RECORD: ICD-10-PCS | Mod: CPTII,,, | Performed by: STUDENT IN AN ORGANIZED HEALTH CARE EDUCATION/TRAINING PROGRAM

## 2023-11-27 PROCEDURE — 99213 OFFICE O/P EST LOW 20 MIN: CPT | Mod: PBBFAC,PN | Performed by: STUDENT IN AN ORGANIZED HEALTH CARE EDUCATION/TRAINING PROGRAM

## 2023-11-27 PROCEDURE — 3008F PR BODY MASS INDEX (BMI) DOCUMENTED: ICD-10-PCS | Mod: CPTII,,, | Performed by: STUDENT IN AN ORGANIZED HEALTH CARE EDUCATION/TRAINING PROGRAM

## 2023-11-27 PROCEDURE — 1160F RVW MEDS BY RX/DR IN RCRD: CPT | Mod: CPTII,,, | Performed by: STUDENT IN AN ORGANIZED HEALTH CARE EDUCATION/TRAINING PROGRAM

## 2023-11-27 PROCEDURE — 1159F MED LIST DOCD IN RCRD: CPT | Mod: CPTII,,, | Performed by: STUDENT IN AN ORGANIZED HEALTH CARE EDUCATION/TRAINING PROGRAM

## 2023-11-27 PROCEDURE — 99214 OFFICE O/P EST MOD 30 MIN: CPT | Mod: S$PBB,,, | Performed by: STUDENT IN AN ORGANIZED HEALTH CARE EDUCATION/TRAINING PROGRAM

## 2023-11-27 PROCEDURE — 1160F PR REVIEW ALL MEDS BY PRESCRIBER/CLIN PHARMACIST DOCUMENTED: ICD-10-PCS | Mod: CPTII,,, | Performed by: STUDENT IN AN ORGANIZED HEALTH CARE EDUCATION/TRAINING PROGRAM

## 2023-11-27 PROCEDURE — 99214 PR OFFICE/OUTPT VISIT, EST, LEVL IV, 30-39 MIN: ICD-10-PCS | Mod: S$PBB,,, | Performed by: STUDENT IN AN ORGANIZED HEALTH CARE EDUCATION/TRAINING PROGRAM

## 2023-11-27 PROCEDURE — 3079F PR MOST RECENT DIASTOLIC BLOOD PRESSURE 80-89 MM HG: ICD-10-PCS | Mod: CPTII,,, | Performed by: STUDENT IN AN ORGANIZED HEALTH CARE EDUCATION/TRAINING PROGRAM

## 2023-11-27 PROCEDURE — 3079F DIAST BP 80-89 MM HG: CPT | Mod: CPTII,,, | Performed by: STUDENT IN AN ORGANIZED HEALTH CARE EDUCATION/TRAINING PROGRAM

## 2023-11-27 PROCEDURE — 3008F BODY MASS INDEX DOCD: CPT | Mod: CPTII,,, | Performed by: STUDENT IN AN ORGANIZED HEALTH CARE EDUCATION/TRAINING PROGRAM

## 2023-11-27 RX ORDER — EMTRICITABINE AND TENOFOVIR DISOPROXIL FUMARATE 200; 300 MG/1; MG/1
1 TABLET, FILM COATED ORAL
COMMUNITY
Start: 2023-10-27

## 2023-11-27 NOTE — LETTER
[address] [facility] [address] [facility] [address]  Phone: [phone]  Fax: [phone]     11/27/2023    To whom it may concern:     Mr. Sandy Evans is currently under my care at [facility].  He is currently being treated for severe depression and has been out from work on medical leave.  We had originally planned for him to return to work without restrictions/accommodations; however, his symptoms have worsened over the past 1-2 weeks, and I think that limiting his work responsibilities is in his best interest from a mental health standpoint.      I am recommending the following accommodations:   Reduced claim volume to 50% for the first 2 weeks after returning to work  Reduced claim volume to 75% for the following two weeks  Resumption of full claim volume after this    If there are any questions regarding the above information, please feel free to call or fax.      Uriah Donahue MD  [facility]  [facility]  11/27/2023, 3:48 PM

## 2023-11-27 NOTE — PROGRESS NOTES
"Outpatient Psychiatry Follow-Up Visit    11/27/2023    Clinical Status of Patient:  Outpatient (Ambulatory)    Chief Complaint:  Sandy Evans is a 38 y.o. male who presents today for follow-up of depression and anxiety.    Patient last seen for follow-up on 11/1/2023. Met with patient.      Interval History and Content of Current Session:  Interim Events/Subjective Report/Content of Current Session:   Patient reports that symptoms have worsened since last visit.  Notes feeling more depressed.  Notes extremely "dark" day 1 week ago, notes suicidal thinking increased dramatically (worse than prior to esketamine treatments).  Now, notes fleeting vague suicidal thinking as recently as earlier today.  Notes that appetite is reduced, eating 1 meal daily.  Sleeping more than typical, 12-14 hours daily.  Energy low, feels sluggish.   Endorses hopelessness.  Endorses irritability.  Denies suicidal thinking during interview.  Denies HI/AVH.  Denies change in chronic somatic complaints.  Denies side effects from current regimen.  Voices frustration due to disability coverage being denied.  Says that his return to work date is tomorrow and worries about his ability to keep up with prior demands.  Feels that starting with a reduced workload and gradually increasing to former capabilities is a better strategy.  Notes that he plans to continue esketamine treatments in the future, currently scheduled for once weekly treatments for the next 4 weeks.    Psychiatric Review of Systems-is patient experiencing or having changes in  See HPI above.      Review of Systems   PSYCHIATRIC: Pertinant items are noted in the narrative.  CONSTITUTIONAL: No weight gain or loss.  MUSCULOSKELETAL: No pain or stiffness of the joints.  NEUROLOGIC: No weakness, sensory changes, seizures, confusion, memory loss, tremor or other abnormal movements.  CARDIAC: No CP, no palpitations  RESPIRATORY: No shortness of breath.  CARDIOVASCULAR: No " "tachycardia or chest pain.  GASTROINTESTINAL: No nausea, vomiting, pain, constipation or diarrhea.    Past Medical, Family and Social History: The patient's past medical, family and social history have been reviewed and updated as appropriate within the electronic medical record - see encounter notes.    Compliance: good    Side effects: denies    Risk Parameters:  Patient reports suicidal ideation: fleeting, much improved  Patient reports no homicidal ideation  Patient reports no self-injurious behavior  Patient reports no violent behavior    Exam (detailed: at least 9 elements; comprehensive: all 15 elements)   Constitutional  Vitals:  Most recent vital signs, dated less than 90 days prior to this appointment, were reviewed.     Vitals:    11/27/23 1508   BP: 138/85   Pulse: 80   Temp: 98.6 °F (37 °C)   SpO2: 100%   Weight: 114.6 kg (252 lb 9.6 oz)        General:   Constitutional:appears stated age, no acute distress    Neurologic:   Motor: moves all extremities spontaneously and without difficulty  Gait: normal gait and station    Mental status examination:   Appearance: appears stated age, casually dressed  Behavior: much more interactive, appropriate for setting  Mood: "not good"  Affect: mood congruent, intense, irritable  Thought process: linear, goal directed  Associations: appropriate for conversation  Thought content: no plan or desire for self harm or harm to others, denies paranoia, no delusional ideation volunteered  Perceptions: denies hallucinations or other altered perceptions  Orientation: oriented to day of week, month, year, location, and situation  Language: English, fluid  Attention: able to attend to interview  Insight: good  Judgement: good    PHQ9:  Over the last two weeks how often have you been bothered by little interest or pleasure in doing things: 3  Over the last two weeks how often have you been bothered by feeling down, depressed or hopeless: 3  PHQ-2 Total Score: 6  PHQ-9 Score: " 18  PHQ-9 Interpretation: Moderately Severe        11/27/2023     3:07 PM 11/1/2023    12:41 PM 9/19/2023     3:02 PM   GAD7   1. Feeling nervous, anxious, or on edge? 1 1 3   2. Not being able to stop or control worrying? 3 1 3   3. Worrying too much about different things? 3 1 3   4. Trouble relaxing? 3 1 3   5. Being so restless that it is hard to sit still? 0 1 1   6. Becoming easily annoyed or irritable? 1 1 1   7. Feeling afraid as if something awful might happen? 1 0 3   8. If you checked off any problems, how difficult have these problems made it for you to do your work, take care of things at home, or get along with other people? 2 1 2   SUJEY-7 Score 12 6 17     Assessment and Diagnosis   Status/Progress: Based on the examination today, the patient's problem(s) is/are worsening.  New problems have not been presented today.   Co-morbidities and Lack of compliance are not complicating management of the primary condition.  Number of separate conditions addressed during today's visit: 2 (mood worsening, anxiety stable).  Are medication adjustments being made today: No.  Are referral(s) being ordered today: No.  Complexity (level) of medical decision making employed in the encounter: HIGH.    General Impression:    ICD-10-CM ICD-9-CM   1. Recurrent major depressive disorder, in partial remission  F33.41 296.35   2. 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 alternative treatments vs. no treatment, as well as serious and common side effects of these treatments, and the inherent unpredictability of individual responses to these treatments. The patient expresses understanding of the above and displays the capacity to agree with this current plan. Patient also agrees that, currently, the benefits outweigh the risks and would like to pursue treatment at this time, and had no other questions.    Instructions:  Take all medications as prescribed.    Abstain from recreational drugs and alcohol.  Present to ED or call 911 for SI/HI plan or intent, psychosis, or medical emergency.    Return to Clinic: No follow-ups on file.    Total time:   The total time for services performed on the date of the encounter (including review of prior visit notes, review of notes from other providers, review of results from laboratory/imaging studies, face-to-face time with patient, and time spent on other activities directly related to patient care): 30 minutes.    Uriah Donahue MD  [facility]

## 2023-12-18 ENCOUNTER — OFFICE VISIT (OUTPATIENT)
Dept: BEHAVIORAL HEALTH | Facility: CLINIC | Age: 38
End: 2023-12-18
Payer: COMMERCIAL

## 2023-12-18 DIAGNOSIS — F33.41 RECURRENT MAJOR DEPRESSIVE DISORDER, IN PARTIAL REMISSION: Primary | ICD-10-CM

## 2023-12-18 DIAGNOSIS — F41.9 ANXIETY: ICD-10-CM

## 2023-12-18 DIAGNOSIS — G47.19 EXCESSIVE DAYTIME SLEEPINESS: ICD-10-CM

## 2023-12-18 DIAGNOSIS — G47.33 OSA ON CPAP: ICD-10-CM

## 2023-12-18 PROCEDURE — 99214 OFFICE O/P EST MOD 30 MIN: CPT | Mod: FQ,,, | Performed by: STUDENT IN AN ORGANIZED HEALTH CARE EDUCATION/TRAINING PROGRAM

## 2023-12-18 PROCEDURE — 1159F PR MEDICATION LIST DOCUMENTED IN MEDICAL RECORD: ICD-10-PCS | Mod: CPTII,FQ,, | Performed by: STUDENT IN AN ORGANIZED HEALTH CARE EDUCATION/TRAINING PROGRAM

## 2023-12-18 PROCEDURE — 1159F MED LIST DOCD IN RCRD: CPT | Mod: CPTII,FQ,, | Performed by: STUDENT IN AN ORGANIZED HEALTH CARE EDUCATION/TRAINING PROGRAM

## 2023-12-18 PROCEDURE — 1160F RVW MEDS BY RX/DR IN RCRD: CPT | Mod: CPTII,FQ,, | Performed by: STUDENT IN AN ORGANIZED HEALTH CARE EDUCATION/TRAINING PROGRAM

## 2023-12-18 PROCEDURE — 99214 PR OFFICE/OUTPT VISIT, EST, LEVL IV, 30-39 MIN: ICD-10-PCS | Mod: FQ,,, | Performed by: STUDENT IN AN ORGANIZED HEALTH CARE EDUCATION/TRAINING PROGRAM

## 2023-12-18 PROCEDURE — 1160F PR REVIEW ALL MEDS BY PRESCRIBER/CLIN PHARMACIST DOCUMENTED: ICD-10-PCS | Mod: CPTII,FQ,, | Performed by: STUDENT IN AN ORGANIZED HEALTH CARE EDUCATION/TRAINING PROGRAM

## 2023-12-18 RX ORDER — MODAFINIL 200 MG/1
400 TABLET ORAL DAILY
Qty: 60 TABLET | Refills: 1 | Status: SHIPPED | OUTPATIENT
Start: 2023-12-18

## 2023-12-18 NOTE — PROGRESS NOTES
"Outpatient Psychiatry Follow-Up Visit    12/18/2023    Clinical Status of Patient:  Outpatient (Ambulatory)    Chief Complaint:  Sandy Evans is a 38 y.o. male who presents today for follow-up of depression and anxiety.    Patient last seen for follow-up on 11/1/2023. Met with patient.      TELE PSYCHIATRY Disclaimer   *The patient was informed despite using HIPPA compliant technology there may be risks including security breach, technological failure, inability to perform a comprehensive physical exam which could delay or prevent an accurate diagnosis, and potential complications from treatment decisions rendered over a telemedical platform.   The patient was also informed of the relationship between the physician and patient and the respective role of any other health care provider with respect to management of the patient; and notified that the pt may decline to receive medical services by telemedicine and may withdraw from such care at any time.     Patient's Current location: [address] [place] [address]     In Case of Emergency pts next of kin  Name:Mary Evans (mother)   Phone number:  [phone]  Visit type: Audio only  Total time spent with patient: 20 minutes    Interval History and Content of Current Session:  Interim Events/Subjective Report/Content of Current Session:   Pt reports doing "better"  overall.  Notes that he is returned to work and that is a reduced workload has been very helpful for re-acclimating to his work environment.  Reports that his moods have been fairly stable, notes a few isolated days with brief exacerbation of depression and suicidal thinking.  Much less severe than reported at his last clinic visit.  Notes sleeping pretty good.   Good appetite.  Continues to report low energy.  Notes that he is improving functionally, denies any difficulty with cooking, cleaning, hygiene, toileting.  Biggest stressor at this point is mental fatigue from his work, " "especially from working directly with customers/clients.  Now receiving Spravato once weekly (typically on Thursdays).  Denies side effects from current medication regimen.  Denies plan or desire for self-harm harm to others.  Voices desire to make medication change to address ongoing symptoms, specifically daytime fatigue.    Psychiatric Review of Systems-is patient experiencing or having changes in  See HPI above.      Review of Systems   PSYCHIATRIC: Pertinant items are noted in the narrative.  CONSTITUTIONAL: No weight gain or loss.  MUSCULOSKELETAL: No pain or stiffness of the joints.  NEUROLOGIC: No weakness, sensory changes, seizures, confusion, memory loss, tremor or other abnormal movements.  CARDIAC: No CP, no palpitations  RESPIRATORY: No shortness of breath.  CARDIOVASCULAR: No tachycardia or chest pain.  GASTROINTESTINAL: No nausea, vomiting, pain, constipation or diarrhea.    Past Medical, Family and Social History: The patient's past medical, family and social history have been reviewed and updated as appropriate within the electronic medical record - see encounter notes.    Compliance: good    Side effects: denies    Risk Parameters:  Patient reports suicidal ideation: fleeting, much improved  Patient reports no homicidal ideation  Patient reports no self-injurious behavior  Patient reports no violent behavior    Exam (detailed: at least 9 elements; comprehensive: all 15 elements)   Constitutional  Vitals:  Most recent vital signs, dated less than 90 days prior to this appointment, were reviewed.     There were no vitals filed for this visit.       General:   Constitutional: HAYDEN 2/2 virtual visit    Neurologic:   Motor: HAYDEN 2/2 virtual visit  Gait: HAYDEN 2/2 virtual visit    Mental status examination:   Appearance: HAYDEN 2/2 virtual visit  Behavior: calm and cooperative  Mood: "better"  Affect: HAYDEN  Thought process: linear, goal directed  Associations: appropriate for conversation  Thought content: no " plan or desire for self harm or harm to others, denies paranoia, no delusional ideation volunteered  Perceptions: denies hallucinations or other altered perceptions  Orientation: oriented to day of week, month, year, location, and situation  Language: English, fluid  Attention: able to attend to interview  Insight: good  Judgement: good    PHQ9:  Over the last two weeks how often have you been bothered by little interest or pleasure in doing things: 3  Over the last two weeks how often have you been bothered by feeling down, depressed or hopeless: 3  PHQ-2 Total Score: 6  PHQ-9 Score: 18  PHQ-9 Interpretation: Moderately Severe        11/27/2023     3:07 PM 11/1/2023    12:41 PM 9/19/2023     3:02 PM   GAD7   1. Feeling nervous, anxious, or on edge? 1 1 3   2. Not being able to stop or control worrying? 3 1 3   3. Worrying too much about different things? 3 1 3   4. Trouble relaxing? 3 1 3   5. Being so restless that it is hard to sit still? 0 1 1   6. Becoming easily annoyed or irritable? 1 1 1   7. Feeling afraid as if something awful might happen? 1 0 3   8. If you checked off any problems, how difficult have these problems made it for you to do your work, take care of things at home, or get along with other people? 2 1 2   SUJEY-7 Score 12 6 17     Assessment and Diagnosis   Status/Progress: Based on the examination today, the patient's problem(s) is/are adequately but not ideally controlled.  New problems have not been presented today.   Co-morbidities and Lack of compliance are not complicating management of the primary condition.  Number of separate conditions addressed during today's visit: 2 (mood improving, fatigue inadequately controlled).  Are medication adjustments being made today: Yes.  Are referral(s) being ordered today: No.  Complexity (level) of medical decision making employed in the encounter: HIGH.    General Impression:    ICD-10-CM ICD-9-CM   1. Recurrent major depressive disorder, in partial  remission  F33.41 296.35   2. Anxiety  F41.9 300.00   3. Excessive daytime sleepiness  G47.19 780.54   4. IRAIS on CPAP  G47.33 327.23     Intervention/Counseling/Treatment Plan   Continue effexor XR 150mg daily  Continue xanax prn (pt taking 1-2x daily)  Continue lunesta 3mg daily  Continue esketamine, defer to outside provider  Increase modafinil to 400mg daily, consider trial of sunosi  Recommend pt continue with current therapist  No need for PEC as pt is not an imminent danger to self or others or gravely disabled due to acute psychiatric illness  Discussed that pt should either call clinic for psychiatric crisis symptoms or present to nearest emergency room    Discussed with patient informed consent including diagnosis, risks and benefits of proposed treatment above vs. alternative treatments vs. no treatment, as well as serious and common side effects of these treatments, and the inherent unpredictability of individual responses to these treatments. The patient expresses understanding of the above and displays the capacity to agree with this current plan. Patient also agrees that, currently, the benefits outweigh the risks and would like to pursue treatment at this time, and had no other questions.    Instructions:  Take all medications as prescribed.    Abstain from recreational drugs and alcohol.  Present to ED or call 911 for SI/HI plan or intent, psychosis, or medical emergency.    Return to Clinic: No follow-ups on file.    Total time:   The total time for services performed on the date of the encounter (including review of prior visit notes, review of notes from other providers, review of results from laboratory/imaging studies, face-to-face time with patient, and time spent on other activities directly related to patient care): 22 minutes.    Uriah Donahue MD  [facility]

## 2024-01-19 DIAGNOSIS — F41.9 ANXIETY: ICD-10-CM

## 2024-01-19 DIAGNOSIS — F33.2 SEVERE EPISODE OF RECURRENT MAJOR DEPRESSIVE DISORDER, WITHOUT PSYCHOTIC FEATURES: ICD-10-CM

## 2024-01-19 RX ORDER — VENLAFAXINE HYDROCHLORIDE 150 MG/1
150 CAPSULE, EXTENDED RELEASE ORAL DAILY
Qty: 30 CAPSULE | Refills: 2 | Status: SHIPPED | OUTPATIENT
Start: 2024-01-19 | End: 2024-04-23 | Stop reason: SDUPTHER

## 2024-01-22 ENCOUNTER — TELEPHONE (OUTPATIENT)
Dept: BEHAVIORAL HEALTH | Facility: CLINIC | Age: 39
End: 2024-01-22
Payer: COMMERCIAL

## 2024-02-01 ENCOUNTER — OFFICE VISIT (OUTPATIENT)
Dept: BEHAVIORAL HEALTH | Facility: CLINIC | Age: 39
End: 2024-02-01
Payer: COMMERCIAL

## 2024-02-01 DIAGNOSIS — F41.9 ANXIETY: ICD-10-CM

## 2024-02-01 DIAGNOSIS — F41.1 GAD (GENERALIZED ANXIETY DISORDER): Primary | ICD-10-CM

## 2024-02-01 DIAGNOSIS — F33.41 RECURRENT MAJOR DEPRESSIVE DISORDER, IN PARTIAL REMISSION: ICD-10-CM

## 2024-02-01 PROCEDURE — 99213 OFFICE O/P EST LOW 20 MIN: CPT | Mod: 95,,, | Performed by: STUDENT IN AN ORGANIZED HEALTH CARE EDUCATION/TRAINING PROGRAM

## 2024-02-01 PROCEDURE — 1159F MED LIST DOCD IN RCRD: CPT | Mod: CPTII,95,, | Performed by: STUDENT IN AN ORGANIZED HEALTH CARE EDUCATION/TRAINING PROGRAM

## 2024-02-01 PROCEDURE — 1160F RVW MEDS BY RX/DR IN RCRD: CPT | Mod: CPTII,95,, | Performed by: STUDENT IN AN ORGANIZED HEALTH CARE EDUCATION/TRAINING PROGRAM

## 2024-02-01 RX ORDER — GABAPENTIN 300 MG/1
300 CAPSULE ORAL 3 TIMES DAILY
Qty: 60 CAPSULE | Refills: 5 | Status: SHIPPED | OUTPATIENT
Start: 2024-02-01 | End: 2024-05-29

## 2024-02-01 RX ORDER — ALPRAZOLAM 1 MG/1
1 TABLET ORAL 3 TIMES DAILY
Qty: 90 TABLET | Refills: 1 | Status: SHIPPED | OUTPATIENT
Start: 2024-02-01 | End: 2024-04-24 | Stop reason: SDUPTHER

## 2024-02-01 NOTE — PROGRESS NOTES
"Outpatient Psychiatry Follow-Up Visit    2/1/2024    Clinical Status of Patient:  Outpatient (Ambulatory)    Chief Complaint:  Sandy Evans is a 38 y.o. male who presents today for follow-up of depression and anxiety.    Patient last seen for follow-up on 11/1/2023. Met with patient.      TELE PSYCHIATRY Disclaimer   *The patient was informed despite using HIPPA compliant technology there may be risks including security breach, technological failure, inability to perform a comprehensive physical exam which could delay or prevent an accurate diagnosis, and potential complications from treatment decisions rendered over a telemedical platform.   The patient was also informed of the relationship between the physician and patient and the respective role of any other health care provider with respect to management of the patient; and notified that the pt may decline to receive medical services by telemedicine and may withdraw from such care at any time.     Patient's Current location: [address] [place] [address]     In Case of Emergency pts next of kin  Name:Mary Evans (mother)   Phone number:  [phone]  Visit type: Audio only  Total time spent with patient: 20 minutes    Interval History and Content of Current Session:  Interim Events/Subjective Report/Content of Current Session:   Pt reports doing "good"  overall.  Occasionally feels "overwhelmed" at work.  Continues to receive esketamine treatments and feels that his treatments are very helpful.  Anxiety fair control but wants to discuss alternatives to xanax due to potential for cognitive effects.  Currently taking xanax 1x on most days (almost never takes 3x daily).  Denies problems with sleep or appetite.  Denies SI/HI/AVH/paranoia, denies plan or desire for self harm or harm to others.  Denies SE from current regimen.  Denies somatic complaints.  Wants to adjust regimen to address ongoing symptoms.      Psychiatric Review of " "Systems-is patient experiencing or having changes in  See HPI above.      Review of Systems   PSYCHIATRIC: Pertinant items are noted in the narrative.  CONSTITUTIONAL: No weight gain or loss.  MUSCULOSKELETAL: No pain or stiffness of the joints.  NEUROLOGIC: No weakness, sensory changes, seizures, confusion, memory loss, tremor or other abnormal movements.  CARDIAC: No CP, no palpitations  RESPIRATORY: No shortness of breath.  CARDIOVASCULAR: No tachycardia or chest pain.  GASTROINTESTINAL: No nausea, vomiting, pain, constipation or diarrhea.    Past Medical, Family and Social History: The patient's past medical, family and social history have been reviewed and updated as appropriate within the electronic medical record - see encounter notes.    Compliance: good    Side effects: denies    Risk Parameters:  Patient reports suicidal ideation: fleeting, much improved  Patient reports no homicidal ideation  Patient reports no self-injurious behavior  Patient reports no violent behavior    Exam (detailed: at least 9 elements; comprehensive: all 15 elements)   Constitutional  Vitals:  Most recent vital signs, dated less than 90 days prior to this appointment, were reviewed.     There were no vitals filed for this visit.       General:   Constitutional: HAYDEN 2/2 virtual visit    Neurologic:   Motor: HAYDEN 2/2 virtual visit  Gait: HAYDEN 2/2 virtual visit    Mental status examination:   Appearance: HAYDEN 2/2 virtual visit  Behavior: calm and cooperative  Mood: "good"  Affect: HAYDEN  Thought process: linear, goal directed  Associations: appropriate for conversation  Thought content: no plan or desire for self harm or harm to others, denies paranoia, no delusional ideation volunteered  Perceptions: denies hallucinations or other altered perceptions  Orientation: oriented to day of week, month, year, location, and situation  Language: English, fluid  Attention: able to attend to interview  Insight: good  Judgement: good    PHQ9:  Over " the last two weeks how often have you been bothered by little interest or pleasure in doing things: 3  Over the last two weeks how often have you been bothered by feeling down, depressed or hopeless: 3  PHQ-2 Total Score: 6  PHQ-9 Score: 18  PHQ-9 Interpretation: Moderately Severe        11/27/2023     3:07 PM 11/1/2023    12:41 PM 9/19/2023     3:02 PM   GAD7   1. Feeling nervous, anxious, or on edge? 1 1 3   2. Not being able to stop or control worrying? 3 1 3   3. Worrying too much about different things? 3 1 3   4. Trouble relaxing? 3 1 3   5. Being so restless that it is hard to sit still? 0 1 1   6. Becoming easily annoyed or irritable? 1 1 1   7. Feeling afraid as if something awful might happen? 1 0 3   8. If you checked off any problems, how difficult have these problems made it for you to do your work, take care of things at home, or get along with other people? 2 1 2   SUJEY-7 Score 12 6 17     Assessment and Diagnosis   Status/Progress: Based on the examination today, the patient's problem(s) is/are well controlled.  New problems have not been presented today.   Co-morbidities and Lack of compliance are not complicating management of the primary condition.  Number of separate conditions addressed during today's visit: 2 (mood improving, sleep fair, anxiety fair).  Are medication adjustments being made today: Yes.  Are referral(s) being ordered today: No.  Complexity (level) of medical decision making employed in the encounter: HIGH.    General Impression:    ICD-10-CM ICD-9-CM   1. SUJEY (generalized anxiety disorder)  F41.1 300.02   2. Recurrent major depressive disorder, in partial remission  F33.41 296.35   3. Anxiety  F41.9 300.00     Intervention/Counseling/Treatment Plan   Continue effexor XR 150mg daily  Decrease xanax to 1mg tid prn (pt taking 1-2x daily)  Start gabapentin 300mg bid prn as off label treatment for anxiety, discussed potential SE including but not limited to sedation, will gradually  plan to replace xanax with gabapentin  Continue lunesta 3mg daily  Continue esketamine, defer to outside provider  Continue modafinil 400mg daily  Pt somewhat interested in medical cannabis, recommended caution  Recommend pt continue with current therapist  No need for PEC as pt is not an imminent danger to self or others or gravely disabled due to acute psychiatric illness  Discussed that pt should either call clinic for psychiatric crisis symptoms or present to nearest emergency room    Discussed with patient informed consent including diagnosis, risks and benefits of proposed treatment above vs. alternative treatments vs. no treatment, as well as serious and common side effects of these treatments, and the inherent unpredictability of individual responses to these treatments. The patient expresses understanding of the above and displays the capacity to agree with this current plan. Patient also agrees that, currently, the benefits outweigh the risks and would like to pursue treatment at this time, and had no other questions.    Instructions:  Take all medications as prescribed.    Abstain from recreational drugs and alcohol.  Present to ED or call 911 for SI/HI plan or intent, psychosis, or medical emergency.    Return to Clinic: Follow up in about 6 weeks (around 3/14/2024).    Total time:   The total time for services performed on the date of the encounter (including review of prior visit notes, review of notes from other providers, review of results from laboratory/imaging studies, face-to-face time with patient, and time spent on other activities directly related to patient care): 22 minutes.    Uriah Donahue MD  [facility]

## 2024-04-23 ENCOUNTER — TELEPHONE (OUTPATIENT)
Dept: BEHAVIORAL HEALTH | Facility: CLINIC | Age: 39
End: 2024-04-23
Payer: COMMERCIAL

## 2024-04-23 DIAGNOSIS — F41.9 ANXIETY: ICD-10-CM

## 2024-04-23 DIAGNOSIS — F33.2 SEVERE EPISODE OF RECURRENT MAJOR DEPRESSIVE DISORDER, WITHOUT PSYCHOTIC FEATURES: ICD-10-CM

## 2024-04-23 RX ORDER — VENLAFAXINE HYDROCHLORIDE 150 MG/1
150 CAPSULE, EXTENDED RELEASE ORAL DAILY
Qty: 30 CAPSULE | Refills: 2 | Status: SHIPPED | OUTPATIENT
Start: 2024-04-23 | End: 2024-05-29

## 2024-04-24 ENCOUNTER — OFFICE VISIT (OUTPATIENT)
Dept: BEHAVIORAL HEALTH | Facility: CLINIC | Age: 39
End: 2024-04-24
Payer: COMMERCIAL

## 2024-04-24 ENCOUNTER — TELEPHONE (OUTPATIENT)
Dept: BEHAVIORAL HEALTH | Facility: CLINIC | Age: 39
End: 2024-04-24
Payer: COMMERCIAL

## 2024-04-24 DIAGNOSIS — F33.2 SEVERE EPISODE OF RECURRENT MAJOR DEPRESSIVE DISORDER, WITHOUT PSYCHOTIC FEATURES: Primary | ICD-10-CM

## 2024-04-24 DIAGNOSIS — F41.9 ANXIETY: ICD-10-CM

## 2024-04-24 DIAGNOSIS — F33.41 RECURRENT MAJOR DEPRESSIVE DISORDER, IN PARTIAL REMISSION: ICD-10-CM

## 2024-04-24 DIAGNOSIS — F41.1 GAD (GENERALIZED ANXIETY DISORDER): Primary | ICD-10-CM

## 2024-04-24 PROCEDURE — 99214 OFFICE O/P EST MOD 30 MIN: CPT | Mod: 95,,, | Performed by: STUDENT IN AN ORGANIZED HEALTH CARE EDUCATION/TRAINING PROGRAM

## 2024-04-24 PROCEDURE — 1159F MED LIST DOCD IN RCRD: CPT | Mod: CPTII,95,, | Performed by: STUDENT IN AN ORGANIZED HEALTH CARE EDUCATION/TRAINING PROGRAM

## 2024-04-24 PROCEDURE — 1160F RVW MEDS BY RX/DR IN RCRD: CPT | Mod: CPTII,95,, | Performed by: STUDENT IN AN ORGANIZED HEALTH CARE EDUCATION/TRAINING PROGRAM

## 2024-04-24 RX ORDER — CARIPRAZINE 1.5 MG/1
1.5 CAPSULE, GELATIN COATED ORAL DAILY
Qty: 30 CAPSULE | Refills: 5 | Status: SHIPPED | OUTPATIENT
Start: 2024-04-24 | End: 2024-04-24 | Stop reason: SDUPTHER

## 2024-04-24 RX ORDER — CARIPRAZINE 1.5 MG/1
1.5 CAPSULE, GELATIN COATED ORAL DAILY
Qty: 30 CAPSULE | Refills: 5 | Status: SHIPPED | OUTPATIENT
Start: 2024-04-24 | End: 2024-05-29

## 2024-04-24 RX ORDER — ALPRAZOLAM 1 MG/1
1 TABLET ORAL 3 TIMES DAILY
Qty: 90 TABLET | Refills: 1 | Status: SHIPPED | OUTPATIENT
Start: 2024-04-24 | End: 2024-05-29 | Stop reason: SDUPTHER

## 2024-04-24 NOTE — PROGRESS NOTES
"Outpatient Psychiatry Follow-Up Visit    4/24/2024    Clinical Status of Patient:  Outpatient (Ambulatory)    Chief Complaint:  Sandy Evans is a 38 y.o. male who presents today for follow-up of depression and anxiety.    Patient last seen for follow-up on 2/1/2024. Met with patient.      TELE PSYCHIATRY Disclaimer   *The patient was informed despite using HIPPA compliant technology there may be risks including security breach, technological failure, inability to perform a comprehensive physical exam which could delay or prevent an accurate diagnosis, and potential complications from treatment decisions rendered over a telemedical platform.   The patient was also informed of the relationship between the physician and patient and the respective role of any other health care provider with respect to management of the patient; and notified that the pt may decline to receive medical services by telemedicine and may withdraw from such care at any time.     Patient's Current location: [address] [place] [address]     In Case of Emergency pts next of kin  Name:Mary Evans (mother)   Phone number:  [phone]  Visit type: Audio only  Total time spent with patient: 20 minutes    Interval History and Content of Current Session:  Interim Events/Subjective Report/Content of Current Session:   Pt reports doing "ok"  overall.  Reports worsening depressed mood (attributes to work stress and health issues of family member), "up and down" anxiety.  Sleep "hit and miss". Appetite fair, weight stable.  Energy low, motivation poor.  Increased irritability, increased hopelessness.  Denies SI/HI/AVH/paranoia, reports recently experiencing vague SI without plan (denies currently), denies plan or desire for self harm or harm to others.  Denies SE from current regimen. Denies somatic complaints.   Pt voices desire to adjust regimen to address ongoing symptoms.  Wants to try alternative to spravato.  Says " "spravato was initially helpful but less helpful recently.  Pt reports he is no longer receiving treatments but doesn't describe how this happened.     Psychiatric Review of Systems-is patient experiencing or having changes in  See HPI above.      Review of Systems   PSYCHIATRIC: Pertinant items are noted in the narrative.  CONSTITUTIONAL: No weight gain or loss.  MUSCULOSKELETAL: No pain or stiffness of the joints.  NEUROLOGIC: No weakness, sensory changes, seizures, confusion, memory loss, tremor or other abnormal movements.  CARDIAC: No CP, no palpitations  RESPIRATORY: No shortness of breath.  CARDIOVASCULAR: No tachycardia or chest pain.  GASTROINTESTINAL: No nausea, vomiting, pain, constipation or diarrhea.    Past Medical, Family and Social History: The patient's past medical, family and social history have been reviewed and updated as appropriate within the electronic medical record - see encounter notes.    Compliance: good    Side effects: denies    Risk Parameters:  Patient reports suicidal ideation: fleeting, much improved  Patient reports no homicidal ideation  Patient reports no self-injurious behavior  Patient reports no violent behavior    Exam (detailed: at least 9 elements; comprehensive: all 15 elements)   Constitutional  Vitals:  Most recent vital signs, dated less than 90 days prior to this appointment, were reviewed.     There were no vitals filed for this visit.       General:   Constitutional: HAYDEN 2/2 virtual visit    Neurologic:   Motor: HAYDEN 2/2 virtual visit  Gait: HAYDEN 2/2 virtual visit    Mental status examination:   Appearance: HAYDEN 2/2 virtual visit  Behavior: reluctant to participate  Mood: "alright"  Affect: HAYDEN  Thought process: linear, goal directed  Associations: appropriate for conversation  Thought content: no plan or desire for self harm or harm to others, denies paranoia, no delusional ideation volunteered  Perceptions: denies hallucinations or other altered " perceptions  Orientation: oriented to day of week, month, year, location, and situation  Language: English, fluid  Attention: able to attend to interview  Insight: good  Judgement: good    PHQ9:  Over the last two weeks how often have you been bothered by little interest or pleasure in doing things: 3  Over the last two weeks how often have you been bothered by feeling down, depressed or hopeless: 3  PHQ-2 Total Score: 6  PHQ-9 Score: 18  PHQ-9 Interpretation: Moderately Severe        11/27/2023     3:07 PM 11/1/2023    12:41 PM 9/19/2023     3:02 PM   GAD7   1. Feeling nervous, anxious, or on edge? 1 1 3   2. Not being able to stop or control worrying? 3 1 3   3. Worrying too much about different things? 3 1 3   4. Trouble relaxing? 3 1 3   5. Being so restless that it is hard to sit still? 0 1 1   6. Becoming easily annoyed or irritable? 1 1 1   7. Feeling afraid as if something awful might happen? 1 0 3   8. If you checked off any problems, how difficult have these problems made it for you to do your work, take care of things at home, or get along with other people? 2 1 2   SUJEY-7 Score 12 6 17     Assessment and Diagnosis   Status/Progress: Based on the examination today, the patient's problem(s) is/are inadequately controlled.  New problems have not been presented today.   Co-morbidities and Lack of compliance are not complicating management of the primary condition.  Number of separate conditions addressed during today's visit: 2 (mood worsening, sleep fair, anxiety fair).  Are medication adjustments being made today: Yes.  Are referral(s) being ordered today: No.  Complexity (level) of medical decision making employed in the encounter: HIGH.    General Impression:    ICD-10-CM ICD-9-CM   1. SUJEY (generalized anxiety disorder)  F41.1 300.02   2. Recurrent major depressive disorder, in partial remission  F33.41 296.35   3. Anxiety  F41.9 300.00       Intervention/Counseling/Treatment Plan   Continue effexor XR  150mg daily  Continue xanax 1mg tid prn (pt taking 1-2x daily)  Continue gabapentin 300mg bid prn as off label treatment for anxiety  Continue lunesta 3mg daily  Continue modafinil 400mg daily  Start vraylar 1.5mg daily, Discussed potential SE including but not limited to sedation, metabolic disturbance, weight gain, movement disorder (including TD)  Pt voiced interest in trying treatment option similar to esketamine, most similar option would be auvelity but recommended trial of vraylar first  Pt no longer receiving esketamine treatments, records from [facility] indicate pt was discharged due to not adhering to restrictions on driving on day of treatments  Recommend pt continue with current therapist  No need for PEC as pt is not an imminent danger to self or others or gravely disabled due to acute psychiatric illness  Discussed that pt should either call clinic for psychiatric crisis symptoms or present to nearest emergency room    Discussed with patient informed consent including diagnosis, risks and benefits of proposed treatment above vs. alternative treatments vs. no treatment, as well as serious and common side effects of these treatments, and the inherent unpredictability of individual responses to these treatments. The patient expresses understanding of the above and displays the capacity to agree with this current plan. Patient also agrees that, currently, the benefits outweigh the risks and would like to pursue treatment at this time, and had no other questions.    Instructions:  Take all medications as prescribed.    Abstain from recreational drugs and alcohol.  Present to ED or call 911 for SI/HI plan or intent, psychosis, or medical emergency.    Return to Clinic: Follow up in about 6 weeks (around 6/5/2024).    Total time:   The total time for services performed on the date of the encounter (including review of prior visit notes, review of notes from other providers, review of results from  laboratory/imaging studies, face-to-face time with patient, and time spent on other activities directly related to patient care): 30 minutes.    Uriah Donahue MD  [facility]

## 2024-05-29 ENCOUNTER — OFFICE VISIT (OUTPATIENT)
Dept: BEHAVIORAL HEALTH | Facility: CLINIC | Age: 39
End: 2024-05-29
Payer: COMMERCIAL

## 2024-05-29 VITALS
HEART RATE: 81 BPM | DIASTOLIC BLOOD PRESSURE: 84 MMHG | WEIGHT: 251.81 LBS | BODY MASS INDEX: 32.33 KG/M2 | SYSTOLIC BLOOD PRESSURE: 121 MMHG | OXYGEN SATURATION: 99 % | TEMPERATURE: 98 F

## 2024-05-29 DIAGNOSIS — F41.1 GAD (GENERALIZED ANXIETY DISORDER): ICD-10-CM

## 2024-05-29 DIAGNOSIS — G47.19 EXCESSIVE DAYTIME SLEEPINESS: ICD-10-CM

## 2024-05-29 DIAGNOSIS — G47.33 OSA ON CPAP: ICD-10-CM

## 2024-05-29 DIAGNOSIS — F33.2 SEVERE EPISODE OF RECURRENT MAJOR DEPRESSIVE DISORDER, WITHOUT PSYCHOTIC FEATURES: Primary | ICD-10-CM

## 2024-05-29 PROCEDURE — 3008F BODY MASS INDEX DOCD: CPT | Mod: CPTII,,, | Performed by: STUDENT IN AN ORGANIZED HEALTH CARE EDUCATION/TRAINING PROGRAM

## 2024-05-29 PROCEDURE — 3074F SYST BP LT 130 MM HG: CPT | Mod: CPTII,,, | Performed by: STUDENT IN AN ORGANIZED HEALTH CARE EDUCATION/TRAINING PROGRAM

## 2024-05-29 PROCEDURE — 1159F MED LIST DOCD IN RCRD: CPT | Mod: CPTII,,, | Performed by: STUDENT IN AN ORGANIZED HEALTH CARE EDUCATION/TRAINING PROGRAM

## 2024-05-29 PROCEDURE — 99213 OFFICE O/P EST LOW 20 MIN: CPT | Mod: PBBFAC,PN | Performed by: STUDENT IN AN ORGANIZED HEALTH CARE EDUCATION/TRAINING PROGRAM

## 2024-05-29 PROCEDURE — 3079F DIAST BP 80-89 MM HG: CPT | Mod: CPTII,,, | Performed by: STUDENT IN AN ORGANIZED HEALTH CARE EDUCATION/TRAINING PROGRAM

## 2024-05-29 PROCEDURE — 1160F RVW MEDS BY RX/DR IN RCRD: CPT | Mod: CPTII,,, | Performed by: STUDENT IN AN ORGANIZED HEALTH CARE EDUCATION/TRAINING PROGRAM

## 2024-05-29 PROCEDURE — 99214 OFFICE O/P EST MOD 30 MIN: CPT | Mod: S$PBB,,, | Performed by: STUDENT IN AN ORGANIZED HEALTH CARE EDUCATION/TRAINING PROGRAM

## 2024-05-29 RX ORDER — VENLAFAXINE HYDROCHLORIDE 225 MG/1
1 TABLET, EXTENDED RELEASE ORAL
COMMUNITY
Start: 2024-05-01 | End: 2024-05-29

## 2024-05-29 RX ORDER — VENLAFAXINE HYDROCHLORIDE 150 MG/1
CAPSULE, EXTENDED RELEASE ORAL
Qty: 30 CAPSULE | Refills: 11 | Status: SHIPPED | OUTPATIENT
Start: 2024-05-29 | End: 2024-06-17 | Stop reason: SDUPTHER

## 2024-05-29 RX ORDER — ALPRAZOLAM 1 MG/1
1 TABLET ORAL 3 TIMES DAILY
Qty: 90 TABLET | Refills: 1 | Status: SHIPPED | OUTPATIENT
Start: 2024-05-29

## 2024-05-29 RX ORDER — MODAFINIL 200 MG/1
400 TABLET ORAL DAILY
Qty: 60 TABLET | Refills: 1 | Status: SHIPPED | OUTPATIENT
Start: 2024-05-29 | End: 2024-06-17 | Stop reason: SDUPTHER

## 2024-05-29 RX ORDER — VENLAFAXINE HYDROCHLORIDE 75 MG/1
CAPSULE, EXTENDED RELEASE ORAL
Qty: 30 CAPSULE | Refills: 11 | Status: SHIPPED | OUTPATIENT
Start: 2024-05-29 | End: 2024-06-17 | Stop reason: SDUPTHER

## 2024-05-29 NOTE — PROGRESS NOTES
"Outpatient Psychiatry Follow-Up Visit    5/29/2024    Clinical Status of Patient:  Outpatient (Ambulatory)    Chief Complaint:  Sandy Evans is a 38 y.o. male who presents today for follow-up of depression and anxiety.    Patient last seen for follow-up on 4/24/2024. Met with patient.      Interval History and Content of Current Session:  Interim Events/Subjective Report/Content of Current Session:   Pt reports doing "about the same"  overall.  Notes continued depressed mood with prominent irritability and hopelessness.  Feels that vraylar has worsened his symptoms, denies any benefits from gabapentin for anxiety thus far.  Taking time off due to mental health symptoms (poor energy, poor motivation, irritability, hopelessness, suicidal thinking) that interfere with his ability to accomplish his work tasks.  Reports elevated anxiety.  Sleep "up and down". Appetite fair, weight stable.  Energy very low, motivation poor.  Endorses irritability, endorses hopelessness.  Endorses nonspecific suicidal thinking as recently as 5-6 days ago, has not acted on thoughts of self harm.  Denies HI/AVH/paranoia, denies plan or desire for self harm or harm to others.  Reports somatic complaints of back pain, otherwise unchanged. Pt voices desire to adjust regimen to address ongoing symptoms.      Psychiatric Review of Systems-is patient experiencing or having changes in  See HPI above.      Review of Systems   PSYCHIATRIC: Pertinant items are noted in the narrative.  CONSTITUTIONAL: No weight gain or loss.  MUSCULOSKELETAL: No pain or stiffness of the joints.  NEUROLOGIC: No weakness, sensory changes, seizures, confusion, memory loss, tremor or other abnormal movements.  CARDIAC: No CP, no palpitations  RESPIRATORY: No shortness of breath.  CARDIOVASCULAR: No tachycardia or chest pain.  GASTROINTESTINAL: No nausea, vomiting, pain, constipation or diarrhea.    Past Medical, Family and Social History: The patient's past medical, " "family and social history have been reviewed and updated as appropriate within the electronic medical record - see encounter notes.    Compliance: good    Side effects: worsening depression from vraylar    Risk Parameters:  Patient reports suicidal ideation: fleeting, much improved  Patient reports no homicidal ideation  Patient reports no self-injurious behavior  Patient reports no violent behavior    Exam (detailed: at least 9 elements; comprehensive: all 15 elements)   Constitutional  Vitals:  Most recent vital signs, dated less than 90 days prior to this appointment, were reviewed.     Vitals:    05/29/24 1536   BP: 121/84   Pulse: 81   Temp: 97.9 °F (36.6 °C)   SpO2: 99%   Weight: 114.2 kg (251 lb 12.8 oz)          General:   Constitutional:appears stated age, no acute distress    Neurologic:   Motor: moves all extremities spontaneously and without difficulty  Gait: normal gait and station    Mental status examination:   Appearance: appears stated age, casually dressed  Behavior: calm and cooperative  Mood: "not good"  Affect: mood congruent, constricted, reactive  Thought process: linear, goal directed  Associations: appropriate for conversation  Thought content: no plan or desire for self harm or harm to others, denies paranoia, no delusional ideation volunteered  Perceptions: denies hallucinations or other altered perceptions  Orientation: oriented to day of week, month, year, location, and situation  Language: English, fluid  Attention: able to attend to interview  Insight: good  Judgement: good    PHQ9:  Over the last two weeks how often have you been bothered by little interest or pleasure in doing things: 2  Over the last two weeks how often have you been bothered by feeling down, depressed or hopeless: 2  PHQ-2 Total Score: 4  PHQ-9 Score: 19  PHQ-9 Interpretation: Moderately Severe        5/29/2024     4:09 PM 11/27/2023     3:07 PM 11/1/2023    12:41 PM   GAD7   1. Feeling nervous, anxious, or on " edge? 2 1 1   2. Not being able to stop or control worrying? 3 3 1   3. Worrying too much about different things? 2 3 1   4. Trouble relaxing? 3 3 1   5. Being so restless that it is hard to sit still? 1 0 1   6. Becoming easily annoyed or irritable? 3 1 1   7. Feeling afraid as if something awful might happen? 2 1 0   8. If you checked off any problems, how difficult have these problems made it for you to do your work, take care of things at home, or get along with other people? 3 2 1   SUJEY-7 Score 16 12 6     Assessment and Diagnosis   Status/Progress: Based on the examination today, the patient's problem(s) is/are inadequately controlled.  New problems have not been presented today.   Co-morbidities and Lack of compliance are not complicating management of the primary condition.  Number of separate conditions addressed during today's visit: 2 (mood uncontrolled and with SE of treatment, anxiety uncontrolled).  Are medication adjustments being made today: Yes.  Are referral(s) being ordered today: No.  Complexity (level) of medical decision making employed in the encounter: HIGH.    General Impression:    ICD-10-CM ICD-9-CM   1. Severe episode of recurrent major depressive disorder, without psychotic features  F33.2 296.33   2. SUJEY (generalized anxiety disorder)  F41.1 300.02   3. Excessive daytime sleepiness  G47.19 780.54   4. IRAIS on CPAP  G47.33 327.23     Intervention/Counseling/Treatment Plan   Continue effexor XR 225mg (150 + 75mg) daily  Continue xanax 1mg tid prn (pt taking 1-2x daily)  Stop gabapentin due to lack of benefit  Continue lunesta 3mg daily  Continue modafinil 400mg daily  Stop vraylar due to SE (worsening depression)  Start auvelity 45-105mg daily x7 days, then twice daily thereafter, discussed potential Se including but not limited to anxiety, jitteriness, insomnia, altered perceptions, depression, suicidal thinking/behavior  Recommend pt continue with current therapist  No need for PEC as pt  is not an imminent danger to self or others or gravely disabled due to acute psychiatric illness  Discussed that pt should either call clinic for psychiatric crisis symptoms or present to nearest emergency room    Discussed with patient informed consent including diagnosis, risks and benefits of proposed treatment above vs. alternative treatments vs. no treatment, as well as serious and common side effects of these treatments, and the inherent unpredictability of individual responses to these treatments. The patient expresses understanding of the above and displays the capacity to agree with this current plan. Patient also agrees that, currently, the benefits outweigh the risks and would like to pursue treatment at this time, and had no other questions.    Instructions:  Take all medications as prescribed.    Abstain from recreational drugs and alcohol.  Present to ED or call 911 for SI/HI plan or intent, psychosis, or medical emergency.    Return to Clinic: Follow up in about 4 weeks (around 6/26/2024) for Virtual Visit.    Total time:   The total time for services performed on the date of the encounter (including review of prior visit notes, review of notes from other providers, review of results from laboratory/imaging studies, face-to-face time with patient, and time spent on other activities directly related to patient care): 30 minutes.    Uriah Donahue MD  [facility]

## 2024-06-17 ENCOUNTER — OFFICE VISIT (OUTPATIENT)
Dept: BEHAVIORAL HEALTH | Facility: CLINIC | Age: 39
End: 2024-06-17
Payer: COMMERCIAL

## 2024-06-17 DIAGNOSIS — F33.2 SEVERE EPISODE OF RECURRENT MAJOR DEPRESSIVE DISORDER, WITHOUT PSYCHOTIC FEATURES: ICD-10-CM

## 2024-06-17 DIAGNOSIS — G47.33 OSA ON CPAP: ICD-10-CM

## 2024-06-17 DIAGNOSIS — F41.1 GAD (GENERALIZED ANXIETY DISORDER): ICD-10-CM

## 2024-06-17 DIAGNOSIS — G47.19 EXCESSIVE DAYTIME SLEEPINESS: ICD-10-CM

## 2024-06-17 DIAGNOSIS — F51.04 PSYCHOPHYSIOLOGICAL INSOMNIA: Primary | ICD-10-CM

## 2024-06-17 PROCEDURE — 1159F MED LIST DOCD IN RCRD: CPT | Mod: CPTII,95,, | Performed by: STUDENT IN AN ORGANIZED HEALTH CARE EDUCATION/TRAINING PROGRAM

## 2024-06-17 PROCEDURE — 99213 OFFICE O/P EST LOW 20 MIN: CPT | Mod: 95,,, | Performed by: STUDENT IN AN ORGANIZED HEALTH CARE EDUCATION/TRAINING PROGRAM

## 2024-06-17 PROCEDURE — 1160F RVW MEDS BY RX/DR IN RCRD: CPT | Mod: CPTII,95,, | Performed by: STUDENT IN AN ORGANIZED HEALTH CARE EDUCATION/TRAINING PROGRAM

## 2024-06-17 RX ORDER — MODAFINIL 200 MG/1
400 TABLET ORAL DAILY
Qty: 180 TABLET | Refills: 0 | Status: SHIPPED | OUTPATIENT
Start: 2024-06-17 | End: 2024-09-15

## 2024-06-17 RX ORDER — VENLAFAXINE HYDROCHLORIDE 75 MG/1
CAPSULE, EXTENDED RELEASE ORAL
Qty: 90 CAPSULE | Refills: 3 | Status: SHIPPED | OUTPATIENT
Start: 2024-06-17

## 2024-06-17 RX ORDER — ESZOPICLONE 3 MG/1
3 TABLET, FILM COATED ORAL NIGHTLY PRN
Qty: 90 TABLET | Refills: 0 | Status: SHIPPED | OUTPATIENT
Start: 2024-06-17

## 2024-06-17 RX ORDER — VENLAFAXINE HYDROCHLORIDE 150 MG/1
CAPSULE, EXTENDED RELEASE ORAL
Qty: 90 CAPSULE | Refills: 3 | Status: SHIPPED | OUTPATIENT
Start: 2024-06-17

## 2024-06-17 NOTE — PROGRESS NOTES
"Outpatient Psychiatry Follow-Up Visit    6/17/2024    Clinical Status of Patient:  Outpatient (Ambulatory)    Chief Complaint:  Sandy Evans is a 38 y.o. male who presents today for follow-up of depression and anxiety.    Patient last seen for follow-up on 5/29/2024. Met with patient.      TELE PSYCHIATRY Disclaimer   *The patient was informed despite using HIPPA compliant technology there may be risks including security breach, technological failure, inability to perform a comprehensive physical exam which could delay or prevent an accurate diagnosis, and potential complications from treatment decisions rendered over a telemedical platform.   The patient was also informed of the relationship between the physician and patient and the respective role of any other health care provider with respect to management of the patient; and notified that the pt may decline to receive medical services by telemedicine and may withdraw from such care at any time.     Patient's Current location: [address] [place] [address]     In Case of Emergency pts next of kin  Name:Mary Evans  Phone number:    [phone]   Visit type: Virtual visit with synchronous audio and video  Total time spent with patient: 15 minutes    Interval History and Content of Current Session:  Interim Events/Subjective Report/Content of Current Session:   Pt reports doing "good"  overall.  Pt now taking auvelity daily and reports noticeable response.  Considering uptitrating to twice daily.  Reports "pretty good" mood, notes improvement in depressive episodes, notes continued depressive symptoms, stable anxiety.  Sleep 6-7 hrs nightly. Appetite good, weight stable.  Energy lower, motivation "fine".  Denies irritability, + intermittent hopelessness.  Denies SI/HI/AVH/paranoia, denies plan or desire for self harm or harm to others.  Reports SE from current regimen: fatigue from auvelity.. Denies change in chronic somatic complaints. " Of note, reports that he was terminated from his former employment and feels that his termination was inappropriate, will seek legal advice regarding his options.  Discussed that his appointments with me in the past and into the future will be strictly focused on medical treatment but records and other supporting documentation are available if needed.  Pt happy with current regimen and wants to continue.     Psychiatric Review of Systems-is patient experiencing or having changes in  See HPI above.      Review of Systems   PSYCHIATRIC: Pertinant items are noted in the narrative.  CONSTITUTIONAL: No weight gain or loss.  MUSCULOSKELETAL: No pain or stiffness of the joints.  NEUROLOGIC: No weakness, sensory changes, seizures, confusion, memory loss, tremor or other abnormal movements.  CARDIAC: No CP, no palpitations  RESPIRATORY: No shortness of breath.  CARDIOVASCULAR: No tachycardia or chest pain.  GASTROINTESTINAL: No nausea, vomiting, pain, constipation or diarrhea.    Past Medical, Family and Social History: The patient's past medical, family and social history have been reviewed and updated as appropriate within the electronic medical record - see encounter notes.    Compliance: good    Side effects: fatigue from auvelity    Risk Parameters:  Patient reports no suicidal ideation  Patient reports no homicidal ideation  Patient reports no self-injurious behavior  Patient reports no violent behavior    Exam (detailed: at least 9 elements; comprehensive: all 15 elements)   Constitutional  Vitals:  Most recent vital signs, dated less than 90 days prior to this appointment, were reviewed.     There were no vitals filed for this visit.       General:   Constitutional:appears stated age, no acute distress    Neurologic:   Motor: moves all extremities spontaneously and without difficulty  Gait: HAYDEN 2/2 virtual visit    Mental status examination:   Appearance: appears stated age, casually dressed  Behavior: calm and  "cooperative  Mood: "better"  Affect: mood congruent, constricted, reactive  Thought process: linear, goal directed  Associations: appropriate for conversation  Thought content: no plan or desire for self harm or harm to others, denies paranoia, no delusional ideation volunteered  Perceptions: denies hallucinations or other altered perceptions  Orientation: oriented to day of week, month, year, location, and situation  Language: English, fluid  Attention: able to attend to interview  Insight: good  Judgement: good    PHQ9:  Over the last two weeks how often have you been bothered by little interest or pleasure in doing things: 2  Over the last two weeks how often have you been bothered by feeling down, depressed or hopeless: 2  PHQ-2 Total Score: 4  PHQ-9 Score: 19  PHQ-9 Interpretation: Moderately Severe        5/29/2024     4:09 PM 11/27/2023     3:07 PM 11/1/2023    12:41 PM   GAD7   1. Feeling nervous, anxious, or on edge? 2 1 1   2. Not being able to stop or control worrying? 3 3 1   3. Worrying too much about different things? 2 3 1   4. Trouble relaxing? 3 3 1   5. Being so restless that it is hard to sit still? 1 0 1   6. Becoming easily annoyed or irritable? 3 1 1   7. Feeling afraid as if something awful might happen? 2 1 0   8. If you checked off any problems, how difficult have these problems made it for you to do your work, take care of things at home, or get along with other people? 3 2 1   SUJEY-7 Score 16 12 6     Assessment and Diagnosis   Status/Progress: Based on the examination today, the patient's problem(s) is/are adequately but not ideally controlled.  New problems have not been presented today.   Co-morbidities and Lack of compliance are not complicating management of the primary condition.  Number of separate conditions addressed during today's visit: 2 (mood improving, anxiety uncontrolled).  Are medication adjustments being made today: No.  Are referral(s) being ordered today: No.  Complexity " (level) of medical decision making employed in the encounter: MODERATE.    General Impression:  No diagnosis found.    Intervention/Counseling/Treatment Plan   Continue effexor XR 225mg (150 + 75mg) daily  Continue xanax 1mg tid prn (pt taking 1-2x daily)  Continue lunesta 3mg daily  Continue modafinil 400mg daily  Continue auvelity 45-105mg daily, discussed option to increase to bid  Recommend pt continue with current therapist  No need for PEC as pt is not an imminent danger to self or others or gravely disabled due to acute psychiatric illness  Discussed that pt should either call clinic for psychiatric crisis symptoms or present to nearest emergency room    Discussed with patient informed consent including diagnosis, risks and benefits of proposed treatment above vs. alternative treatments vs. no treatment, as well as serious and common side effects of these treatments, and the inherent unpredictability of individual responses to these treatments. The patient expresses understanding of the above and displays the capacity to agree with this current plan. Patient also agrees that, currently, the benefits outweigh the risks and would like to pursue treatment at this time, and had no other questions.    Instructions:  Take all medications as prescribed.    Abstain from recreational drugs and alcohol.  Present to ED or call 911 for SI/HI plan or intent, psychosis, or medical emergency.    Return to Clinic: Follow up in about 2 months (around 8/17/2024).    Total time:   The total time for services performed on the date of the encounter (including review of prior visit notes, review of notes from other providers, review of results from laboratory/imaging studies, face-to-face time with patient, and time spent on other activities directly related to patient care): 21 minutes.    Uriah Donahue MD  [facility]

## 2024-06-27 ENCOUNTER — TELEPHONE (OUTPATIENT)
Dept: BEHAVIORAL HEALTH | Facility: CLINIC | Age: 39
End: 2024-06-27
Payer: COMMERCIAL

## 2024-06-27 NOTE — TELEPHONE ENCOUNTER
Spoke to pharm and was informed that dextromethorphan-bupropion  mg had been cancelled by Dr Donahue.

## 2024-06-27 NOTE — TELEPHONE ENCOUNTER
----- Message from Beverly Melissa sent at 6/27/2024 10:26 AM CDT -----  Regarding: med refill  Description: pt need a refill for medication , pharmacy says they dont have a refill on file although pt has refills left, pt also wants to see if lilian can give him a 3m supply bc his ins is changing and he wants enough to last until he gets another ins company    Medicine: dextromethorphan-bupropion  mg TbIE    Pharmacy:[facility] [place], [place] - [address] [facility] [address] [facility]    VM/Phonecall: phone call    Who Called/number?: patient, [phone]    Callback needed?: yes, to elt pt know if his 1m supply can get changed to 3m or if he needs a virtual appt    LOV?:06/17/24    NOV?:08/12/24

## 2024-08-12 ENCOUNTER — OFFICE VISIT (OUTPATIENT)
Dept: BEHAVIORAL HEALTH | Facility: CLINIC | Age: 39
End: 2024-08-12
Payer: MEDICAID

## 2024-08-12 DIAGNOSIS — G47.19 EXCESSIVE DAYTIME SLEEPINESS: ICD-10-CM

## 2024-08-12 DIAGNOSIS — F41.1 GAD (GENERALIZED ANXIETY DISORDER): ICD-10-CM

## 2024-08-12 DIAGNOSIS — G47.33 OSA ON CPAP: ICD-10-CM

## 2024-08-12 DIAGNOSIS — F33.41 RECURRENT MAJOR DEPRESSIVE DISORDER, IN PARTIAL REMISSION: Primary | ICD-10-CM

## 2024-08-12 PROCEDURE — 1159F MED LIST DOCD IN RCRD: CPT | Mod: CPTII,95,, | Performed by: STUDENT IN AN ORGANIZED HEALTH CARE EDUCATION/TRAINING PROGRAM

## 2024-08-12 PROCEDURE — 99443 PR PHYSICIAN TELEPHONE EVALUATION 21-30 MIN: CPT | Mod: 95,,, | Performed by: STUDENT IN AN ORGANIZED HEALTH CARE EDUCATION/TRAINING PROGRAM

## 2024-08-12 PROCEDURE — 1160F RVW MEDS BY RX/DR IN RCRD: CPT | Mod: CPTII,95,, | Performed by: STUDENT IN AN ORGANIZED HEALTH CARE EDUCATION/TRAINING PROGRAM

## 2024-08-12 RX ORDER — MODAFINIL 200 MG/1
400 TABLET ORAL DAILY
Qty: 180 TABLET | Refills: 1 | Status: SHIPPED | OUTPATIENT
Start: 2024-08-12

## 2024-08-12 RX ORDER — ALPRAZOLAM 1 MG/1
1 TABLET ORAL 3 TIMES DAILY
Qty: 90 TABLET | Refills: 1 | Status: SHIPPED | OUTPATIENT
Start: 2024-08-12

## 2024-08-12 RX ORDER — VENLAFAXINE HYDROCHLORIDE 150 MG/1
CAPSULE, EXTENDED RELEASE ORAL
Qty: 90 CAPSULE | Refills: 3 | Status: SHIPPED | OUTPATIENT
Start: 2024-08-12

## 2024-08-12 RX ORDER — VENLAFAXINE HYDROCHLORIDE 75 MG/1
CAPSULE, EXTENDED RELEASE ORAL
Qty: 90 CAPSULE | Refills: 3 | Status: SHIPPED | OUTPATIENT
Start: 2024-08-12

## 2024-08-12 NOTE — PROGRESS NOTES
Outpatient Psychiatry Follow-Up Visit    8/12/2024    Clinical Status of Patient:  Outpatient (Ambulatory)    Chief Complaint:  Sandy Evans is a 38 y.o. male who presents today for follow-up of depression and anxiety.    Patient last seen for follow-up on 6/17/2024. Met with patient.      TELE PSYCHIATRY Disclaimer   *The patient was informed despite using HIPPA compliant technology there may be risks including security breach, technological failure, inability to perform a comprehensive physical exam which could delay or prevent an accurate diagnosis, and potential complications from treatment decisions rendered over a telemedical platform.   The patient was also informed of the relationship between the physician and patient and the respective role of any other health care provider with respect to management of the patient; and notified that the pt may decline to receive medical services by telemedicine and may withdraw from such care at any time.     Patient's Current location: [address] [place] [address]     In Case of Emergency pts next of kin  Name:Mary Evans  Phone number:    [phone]   Visit type: Audio only  Total time spent with patient: 21 minutes    Interval History and Content of Current Session:  Interim Events/Subjective Report/Content of Current Session:   Pt reports doing well overall.  Notes mood continues to improve overall.  Denies significant depression over the past 2 months other than triggered mood episode (due to environmental stressors).  Anxiety well controlled as well.  Sleep improving.  Motivation good, energy fair.  Denies irritability, denies hopelessness.  Denies SI/HI/AVH/paranoia, denies plan or desire for self harm or harm to others.  Denies SE from current regimen.  Denies somatic complaints.  Overall, pt happy with current regimen and wants to continue.      Psychiatric Review of Systems-is patient experiencing or having changes in  See HPI above.  "     Review of Systems   PSYCHIATRIC: Pertinant items are noted in the narrative.  CONSTITUTIONAL: No weight gain or loss.  MUSCULOSKELETAL: No pain or stiffness of the joints.  NEUROLOGIC: No weakness, sensory changes, seizures, confusion, memory loss, tremor or other abnormal movements.  CARDIAC: No CP, no palpitations  RESPIRATORY: No shortness of breath.  CARDIOVASCULAR: No tachycardia or chest pain.  GASTROINTESTINAL: No nausea, vomiting, pain, constipation or diarrhea.    Past Medical, Family and Social History: The patient's past medical, family and social history have been reviewed and updated as appropriate within the electronic medical record - see encounter notes.    Compliance: good    Side effects: denies    Risk Parameters:  Patient reports no suicidal ideation  Patient reports no homicidal ideation  Patient reports no self-injurious behavior  Patient reports no violent behavior    Exam (detailed: at least 9 elements; comprehensive: all 15 elements)   Constitutional  Vitals:  Most recent vital signs, dated less than 90 days prior to this appointment, were reviewed.     There were no vitals filed for this visit.       General:   Constitutional: HAYDEN 2/2 virtual visit    Neurologic:   Motor: HAYDEN 2/2 virtual visit  Gait: HAYDEN 2/2 virtual visit    Mental status examination:   Appearance: HAYDEN 2/2 virtual visit  Behavior: calm and cooperative  Mood: "really good"  Affect: HAYDEN  Thought process: linear, goal directed  Associations: appropriate for conversation  Thought content: no plan or desire for self harm or harm to others, denies paranoia, no delusional ideation volunteered  Perceptions: denies hallucinations or other altered perceptions  Orientation: oriented to day of week, month, year, location, and situation  Language: English, fluid  Attention: able to attend to interview  Insight: good  Judgement: good    PHQ9:  Over the last two weeks how often have you been bothered by little interest or pleasure in " doing things: 2  Over the last two weeks how often have you been bothered by feeling down, depressed or hopeless: 2  PHQ-2 Total Score: 4  PHQ-9 Score: 19  PHQ-9 Interpretation: Moderately Severe        5/29/2024     4:09 PM 11/27/2023     3:07 PM 11/1/2023    12:41 PM   GAD7   1. Feeling nervous, anxious, or on edge? 2 1 1   2. Not being able to stop or control worrying? 3 3 1   3. Worrying too much about different things? 2 3 1   4. Trouble relaxing? 3 3 1   5. Being so restless that it is hard to sit still? 1 0 1   6. Becoming easily annoyed or irritable? 3 1 1   7. Feeling afraid as if something awful might happen? 2 1 0   8. If you checked off any problems, how difficult have these problems made it for you to do your work, take care of things at home, or get along with other people? 3 2 1   SUJEY-7 Score 16 12 6     Assessment and Diagnosis   Status/Progress: Based on the examination today, the patient's problem(s) is/are well controlled.  New problems have not been presented today.   Co-morbidities and Lack of compliance are not complicating management of the primary condition.  Number of separate conditions addressed during today's visit: 2 (mood well controlled, anxiety improving).  Are medication adjustments being made today: No.  Are referral(s) being ordered today: No.  Complexity (level) of medical decision making employed in the encounter: MODERATE.    General Impression:    ICD-10-CM ICD-9-CM   1. Recurrent major depressive disorder, in partial remission  F33.41 296.35   2. SUJEY (generalized anxiety disorder)  F41.1 300.02   3. Excessive daytime sleepiness  G47.19 780.54   4. IRAIS on CPAP  G47.33 327.23     Intervention/Counseling/Treatment Plan   Continue effexor XR 225mg (150 + 75mg) daily  Continue xanax 1mg tid prn (pt taking 1-2x daily)  Continue lunesta 3mg daily  Continue modafinil 400mg daily  Continue auvelity 45-105mg daily, discussed option to increase to bid  Recommend pt continue with current  therapist  No need for PEC as pt is not an imminent danger to self or others or gravely disabled due to acute psychiatric illness  Discussed that pt should either call clinic for psychiatric crisis symptoms or present to nearest emergency room    Discussed with patient informed consent including diagnosis, risks and benefits of proposed treatment above vs. alternative treatments vs. no treatment, as well as serious and common side effects of these treatments, and the inherent unpredictability of individual responses to these treatments. The patient expresses understanding of the above and displays the capacity to agree with this current plan. Patient also agrees that, currently, the benefits outweigh the risks and would like to pursue treatment at this time, and had no other questions.    Instructions:  Take all medications as prescribed.    Abstain from recreational drugs and alcohol.  Present to ED or call 911 for SI/HI plan or intent, psychosis, or medical emergency.    Return to Clinic: Follow up in about 3 months (around 11/12/2024).    Total time:   The total time for services performed on the date of the encounter (including review of prior visit notes, review of notes from other providers, review of results from laboratory/imaging studies, face-to-face time with patient, and time spent on other activities directly related to patient care): 21 minutes.    Uriah Donahue MD  [facility]

## 2024-11-13 ENCOUNTER — OFFICE VISIT (OUTPATIENT)
Dept: BEHAVIORAL HEALTH | Facility: CLINIC | Age: 39
End: 2024-11-13
Payer: MEDICAID

## 2024-11-13 DIAGNOSIS — F33.41 RECURRENT MAJOR DEPRESSIVE DISORDER, IN PARTIAL REMISSION: ICD-10-CM

## 2024-11-13 DIAGNOSIS — G47.19 EXCESSIVE DAYTIME SLEEPINESS: ICD-10-CM

## 2024-11-13 DIAGNOSIS — F51.04 PSYCHOPHYSIOLOGICAL INSOMNIA: ICD-10-CM

## 2024-11-13 DIAGNOSIS — G47.33 OSA ON CPAP: ICD-10-CM

## 2024-11-13 DIAGNOSIS — F41.1 GAD (GENERALIZED ANXIETY DISORDER): ICD-10-CM

## 2024-11-14 ENCOUNTER — TELEPHONE (OUTPATIENT)
Dept: BEHAVIORAL HEALTH | Facility: CLINIC | Age: 39
End: 2024-11-14
Payer: MEDICAID

## 2024-11-14 RX ORDER — MODAFINIL 200 MG/1
400 TABLET ORAL DAILY
Qty: 180 TABLET | Refills: 1 | Status: SHIPPED | OUTPATIENT
Start: 2024-11-14

## 2024-11-14 RX ORDER — ALPRAZOLAM 1 MG/1
1 TABLET ORAL 3 TIMES DAILY
Qty: 90 TABLET | Refills: 1 | Status: SHIPPED | OUTPATIENT
Start: 2024-11-14

## 2024-11-14 RX ORDER — ESZOPICLONE 3 MG/1
3 TABLET, FILM COATED ORAL NIGHTLY PRN
Qty: 90 TABLET | Refills: 1 | Status: SHIPPED | OUTPATIENT
Start: 2024-11-14

## 2024-11-14 RX ORDER — VENLAFAXINE HYDROCHLORIDE 75 MG/1
CAPSULE, EXTENDED RELEASE ORAL
Qty: 90 CAPSULE | Refills: 3 | Status: SHIPPED | OUTPATIENT
Start: 2024-11-14

## 2024-11-14 RX ORDER — VENLAFAXINE HYDROCHLORIDE 150 MG/1
CAPSULE, EXTENDED RELEASE ORAL
Qty: 90 CAPSULE | Refills: 3 | Status: SHIPPED | OUTPATIENT
Start: 2024-11-14

## 2024-11-14 NOTE — PROGRESS NOTES
"Outpatient Psychiatry Follow-Up Visit    11/13/2024    Clinical Status of Patient:  Outpatient (Ambulatory)    Chief Complaint:  Sandy Evans is a 39 y.o. male who presents today for follow-up of depression and anxiety.    Patient last seen for follow-up on 8/12/2024. Met with patient.      TELE PSYCHIATRY Disclaimer   *The patient was informed despite using HIPPA compliant technology there may be risks including security breach, technological failure, inability to perform a comprehensive physical exam which could delay or prevent an accurate diagnosis, and potential complications from treatment decisions rendered over a telemedical platform.   The patient was also informed of the relationship between the physician and patient and the respective role of any other health care provider with respect to management of the patient; and notified that the pt may decline to receive medical services by telemedicine and may withdraw from such care at any time.     Patient's Current location: [address] [place] [address]     In Case of Emergency pts next of kin  Name:Mary Evans  Phone number:    [phone]   Visit type: Audio only  Total time spent with patient: 15 minutes    Interval History and Content of Current Session:  Interim Events/Subjective Report/Content of Current Session:   Pt reports doing well overall.  Notes mood has been "good," denies ongoing depression.  Anxiety well controlled as well.  Sleeping well.  Motivation good, energy low (attributes to IRAIS).  Fair irritability, decreased hopelessness.  Denies SI/HI/AVH/paranoia, denies plan or desire for self harm or harm to others.  Denies SE from current regimen.  Denies somatic complaints.  Overall, pt happy with current regimen and wants to continue.      Psychiatric Review of Systems-is patient experiencing or having changes in  See HPI above.      Review of Systems   PSYCHIATRIC: Pertinant items are noted in the " "narrative.  CONSTITUTIONAL: No weight gain or loss.  MUSCULOSKELETAL: No pain or stiffness of the joints.  NEUROLOGIC: No weakness, sensory changes, seizures, confusion, memory loss, tremor or other abnormal movements.  CARDIAC: No CP, no palpitations  RESPIRATORY: No shortness of breath.  CARDIOVASCULAR: No tachycardia or chest pain.  GASTROINTESTINAL: No nausea, vomiting, pain, constipation or diarrhea.    Past Medical, Family and Social History: The patient's past medical, family and social history have been reviewed and updated as appropriate within the electronic medical record - see encounter notes.    Compliance: good    Side effects: denies    Risk Parameters:  Patient reports no suicidal ideation  Patient reports no homicidal ideation  Patient reports no self-injurious behavior  Patient reports no violent behavior    Exam (detailed: at least 9 elements; comprehensive: all 15 elements)   Constitutional  Vitals:  Most recent vital signs, dated less than 90 days prior to this appointment, were reviewed.     There were no vitals filed for this visit.       General:   Constitutional: HAYDEN 2/2 virtual visit    Neurologic:   Motor: HAYDEN 2/2 virtual visit  Gait: HAYDEN 2/2 virtual visit    Mental status examination:   Appearance: HAYDEN 2/2 virtual visit  Behavior: calm and cooperative  Mood: "good"  Affect: HAYDEN  Thought process: linear, goal directed  Associations: appropriate for conversation  Thought content: no plan or desire for self harm or harm to others, denies paranoia, no delusional ideation volunteered  Perceptions: denies hallucinations or other altered perceptions  Orientation: oriented to day of week, month, year, location, and situation  Language: English, fluid  Attention: able to attend to interview  Insight: good  Judgement: good    PHQ9:  Over the last two weeks how often have you been bothered by little interest or pleasure in doing things: 2  Over the last two weeks how often have you been bothered by " feeling down, depressed or hopeless: 2  PHQ-2 Total Score: 4  PHQ-9 Score: 19  PHQ-9 Interpretation: Moderately Severe        5/29/2024     4:09 PM 11/27/2023     3:07 PM 11/1/2023    12:41 PM   GAD7   1. Feeling nervous, anxious, or on edge? 2 1 1   2. Not being able to stop or control worrying? 3 3 1   3. Worrying too much about different things? 2 3 1   4. Trouble relaxing? 3 3 1   5. Being so restless that it is hard to sit still? 1 0 1   6. Becoming easily annoyed or irritable? 3 1 1   7. Feeling afraid as if something awful might happen? 2 1 0   8. If you checked off any problems, how difficult have these problems made it for you to do your work, take care of things at home, or get along with other people? 3 2 1   SUJEY-7 Score 16 12 6     Assessment and Diagnosis   Status/Progress: Based on the examination today, the patient's problem(s) is/are well controlled.  New problems have not been presented today.   Co-morbidities and Lack of compliance are not complicating management of the primary condition.  Number of separate conditions addressed during today's visit: 2 (mood well controlled, anxiety improving).  Are medication adjustments being made today: No.  Are referral(s) being ordered today: No.  Complexity (level) of medical decision making employed in the encounter: MODERATE.    General Impression:    ICD-10-CM ICD-9-CM   1. Recurrent major depressive disorder, in partial remission  F33.41 296.35   2. SUJEY (generalized anxiety disorder)  F41.1 300.02   3. Psychophysiological insomnia  F51.04 307.42   4. Excessive daytime sleepiness  G47.19 780.54   5. IRAIS on CPAP  G47.33 327.23       Intervention/Counseling/Treatment Plan   Continue effexor XR 225mg (150 + 75mg) daily  Continue xanax 1mg tid prn (pt taking 1-2x daily)  Continue lunesta 3mg daily  Continue modafinil 400mg daily  Continue auvelity 45-105mg daily, discussed option to increase to bid  Recommend pt continue with current therapist  No need for PEC  as pt is not an imminent danger to self or others or gravely disabled due to acute psychiatric illness  Discussed that pt should either call clinic for psychiatric crisis symptoms or present to nearest emergency room    Discussed with patient informed consent including diagnosis, risks and benefits of proposed treatment above vs. alternative treatments vs. no treatment, as well as serious and common side effects of these treatments, and the inherent unpredictability of individual responses to these treatments. The patient expresses understanding of the above and displays the capacity to agree with this current plan. Patient also agrees that, currently, the benefits outweigh the risks and would like to pursue treatment at this time, and had no other questions.    Instructions:  Take all medications as prescribed.    Abstain from recreational drugs and alcohol.  Present to ED or call 911 for SI/HI plan or intent, psychosis, or medical emergency.    Return to Clinic: Follow up in about 3 months (around 2/13/2025).    Total time:   The total time for services performed on the date of the encounter (including review of prior visit notes, review of notes from other providers, review of results from laboratory/imaging studies, face-to-face time with patient, and time spent on other activities directly related to patient care): 15 minutes.    Uriah Donahue MD  [facility]

## 2024-11-14 NOTE — TELEPHONE ENCOUNTER
----- Message from Jackie sent at 11/14/2024 11:20 AM CST -----  Regarding: Pharmacy call  Who Called: [facility] pharmacy    Pharmacy is calling to request assistance with Rx    Pharmacy name and phone number: [facility] pharmacy  Pharmacy contact: [phone]  Patient Name: Sandy  Prescription Name: eszopiclone (LUNESTA) 3 mg Tab and  modafiniL   What do they need to clarify?: which one is pt supposed to be taking for insomnia         Preferred Method of Contact: Phone Call  Patient's Preferred Phone Number on File: [phone]   Best Call Back Number, if different:  Additional Information:

## 2025-02-17 ENCOUNTER — OFFICE VISIT (OUTPATIENT)
Dept: BEHAVIORAL HEALTH | Facility: CLINIC | Age: 40
End: 2025-02-17
Payer: MEDICAID

## 2025-02-17 DIAGNOSIS — F41.1 GAD (GENERALIZED ANXIETY DISORDER): ICD-10-CM

## 2025-02-17 DIAGNOSIS — G47.33 OSA ON CPAP: ICD-10-CM

## 2025-02-17 DIAGNOSIS — F51.04 PSYCHOPHYSIOLOGICAL INSOMNIA: ICD-10-CM

## 2025-02-17 DIAGNOSIS — F33.41 RECURRENT MAJOR DEPRESSIVE DISORDER, IN PARTIAL REMISSION: Primary | ICD-10-CM

## 2025-02-17 DIAGNOSIS — G47.19 EXCESSIVE DAYTIME SLEEPINESS: ICD-10-CM

## 2025-02-17 RX ORDER — VENLAFAXINE HYDROCHLORIDE 75 MG/1
CAPSULE, EXTENDED RELEASE ORAL
Qty: 90 CAPSULE | Refills: 3 | Status: SHIPPED | OUTPATIENT
Start: 2025-02-17

## 2025-02-17 RX ORDER — MODAFINIL 200 MG/1
400 TABLET ORAL DAILY
Qty: 180 TABLET | Refills: 1 | Status: SHIPPED | OUTPATIENT
Start: 2025-02-17

## 2025-02-17 RX ORDER — VENLAFAXINE HYDROCHLORIDE 150 MG/1
CAPSULE, EXTENDED RELEASE ORAL
Qty: 90 CAPSULE | Refills: 3 | Status: SHIPPED | OUTPATIENT
Start: 2025-02-17

## 2025-02-17 RX ORDER — ESZOPICLONE 3 MG/1
3 TABLET, FILM COATED ORAL NIGHTLY PRN
Qty: 90 TABLET | Refills: 1 | Status: SHIPPED | OUTPATIENT
Start: 2025-02-17

## 2025-02-17 RX ORDER — ALPRAZOLAM 1 MG/1
1 TABLET ORAL 3 TIMES DAILY
Qty: 90 TABLET | Refills: 1 | Status: SHIPPED | OUTPATIENT
Start: 2025-02-17

## 2025-02-17 NOTE — PROGRESS NOTES
"Outpatient Psychiatry Follow-Up Visit    2/17/2025    Clinical Status of Patient:  Outpatient (Ambulatory)    Chief Complaint:  Sandy Evans is a 39 y.o. male who presents today for follow-up of depression and anxiety.    Patient last seen for follow-up on 11/13/2024. Met with patient.      TELE PSYCHIATRY Disclaimer   *The patient was informed despite using HIPPA compliant technology there may be risks including security breach, technological failure, inability to perform a comprehensive physical exam which could delay or prevent an accurate diagnosis, and potential complications from treatment decisions rendered over a telemedical platform.   The patient was also informed of the relationship between the physician and patient and the respective role of any other health care provider with respect to management of the patient; and notified that the pt may decline to receive medical services by telemedicine and may withdraw from such care at any time.     Patient's Current location: [address] [place] [address]     In Case of Emergency pts next of kin  Name:Mary Evans  Phone number:    [phone]  Visit type: Virtual visit with synchronous audio and video  Total time spent with patient: 20 minutes    Interval History and Content of Current Session:  Interim Events/Subjective Report/Content of Current Session:   Pt reports doing "really good"  overall.  Reports good mood, well controlled anxiety.  Sleep fair, goes to bed late and feels tired on awakening. Appetite good, weight stable.  Energy fair, motivation good.  Denies irritability, denies hopelessness.  Denies SI/HI/AVH/paranoia, denies plan or desire for self harm or harm to others.  Denies SE from current regimen. Denies change in chronic somatic complaints. Pt happy with current regimen and wants to continue.     Psychiatric Review of Systems-is patient experiencing or having changes in  See HPI above.      Review of Systems " "  PSYCHIATRIC: Pertinant items are noted in the narrative.  CONSTITUTIONAL: No weight gain or loss.  MUSCULOSKELETAL: No pain or stiffness of the joints.  NEUROLOGIC: No weakness, sensory changes, seizures, confusion, memory loss, tremor or other abnormal movements.  CARDIAC: No CP, no palpitations  RESPIRATORY: No shortness of breath.  CARDIOVASCULAR: No tachycardia or chest pain.  GASTROINTESTINAL: No nausea, vomiting, pain, constipation or diarrhea.    Past Medical, Family and Social History: The patient's past medical, family and social history have been reviewed and updated as appropriate within the electronic medical record - see encounter notes.    Compliance: good    Side effects: denies    Risk Parameters:  Patient reports no suicidal ideation  Patient reports no homicidal ideation  Patient reports no self-injurious behavior  Patient reports no violent behavior    Exam (detailed: at least 9 elements; comprehensive: all 15 elements)   Constitutional  Vitals:  Most recent vital signs, dated less than 90 days prior to this appointment, were reviewed.     There were no vitals filed for this visit.       General:   Constitutional:appears stated age, no acute distress    Neurologic:   Motor: moves all extremities spontaneously and without difficulty  Gait: [facility] 2/2 virtual visit    Mental status examination:   Appearance: appears stated age, casually dressed  Behavior: calm and cooperative  Mood: "good"  Affect: mood congruent, euthymic  Thought process: linear, goal directed  Associations: appropriate for conversation  Thought content: no plan or desire for self harm or harm to others, denies paranoia, no delusional ideation volunteered  Perceptions: denies hallucinations or other altered perceptions  Orientation: oriented to day of week, month, year, location, and situation  Language: English, fluid  Attention: able to attend to interview  Insight: good  Judgement: good    PHQ9:  Over the last two weeks how " often have you been bothered by little interest or pleasure in doing things: 2  Over the last two weeks how often have you been bothered by feeling down, depressed or hopeless: 2  PHQ-2 Total Score: 4  PHQ-9 Score: 19  PHQ-9 Interpretation: Moderately Severe        5/29/2024     4:09 PM 11/27/2023     3:07 PM 11/1/2023    12:41 PM   GAD7   1. Feeling nervous, anxious, or on edge? 2 1 1   2. Not being able to stop or control worrying? 3 3 1   3. Worrying too much about different things? 2 3 1   4. Trouble relaxing? 3 3 1   5. Being so restless that it is hard to sit still? 1 0 1   6. Becoming easily annoyed or irritable? 3 1 1   7. Feeling afraid as if something awful might happen? 2 1 0   8. If you checked off any problems, how difficult have these problems made it for you to do your work, take care of things at home, or get along with other people? 3 2 1   SUJEY-7 Score 16 12 6     Assessment and Diagnosis   Status/Progress: Based on the examination today, the patient's problem(s) is/are well controlled.  New problems have not been presented today.   Co-morbidities and Lack of compliance are not complicating management of the primary condition.  Number of separate conditions addressed during today's visit: 2 (mood well controlled, anxiety improving).  Are medication adjustments being made today: No.  Are referral(s) being ordered today: No.  Complexity (level) of medical decision making employed in the encounter: MODERATE.    General Impression:    ICD-10-CM ICD-9-CM   1. Recurrent major depressive disorder, in partial remission  F33.41 296.35   2. SUJEY (generalized anxiety disorder)  F41.1 300.02   3. Psychophysiological insomnia  F51.04 307.42   4. Excessive daytime sleepiness  G47.19 780.54   5. IRAIS on CPAP  G47.33 327.23     Intervention/Counseling/Treatment Plan   Continue effexor XR 225mg (150 + 75mg) daily  Continue xanax 1mg tid prn (pt taking 1-2x daily)  Continue lunesta 3mg daily  Continue modafinil 400mg  daily  Continue auvelity 45-105mg daily  Recommend pt continue with current therapist  No need for PEC as pt is not an imminent danger to self or others or gravely disabled due to acute psychiatric illness  Discussed that pt should either call clinic for psychiatric crisis symptoms or present to nearest emergency room    Discussed with patient informed consent including diagnosis, risks and benefits of proposed treatment above vs. alternative treatments vs. no treatment, as well as serious and common side effects of these treatments, and the inherent unpredictability of individual responses to these treatments. The patient expresses understanding of the above and displays the capacity to agree with this current plan. Patient also agrees that, currently, the benefits outweigh the risks and would like to pursue treatment at this time, and had no other questions.    Instructions:  Take all medications as prescribed.    Abstain from recreational drugs and alcohol.  Present to ED or call 911 for SI/HI plan or intent, psychosis, or medical emergency.    Return to Clinic: Follow up if symptoms worsen or fail to improve.  Given that provider is leaving in the near future, will send list of psychiatry providers to pt's portal account to assist with continuation of care.     Total time:   The total time for services performed on the date of the encounter (including review of prior visit notes, review of notes from other providers, review of results from laboratory/imaging studies, face-to-face time with patient, and time spent on other activities directly related to patient care): 20 minutes.    Uriah Donahue MD  [facility]

## 2025-07-15 DIAGNOSIS — F41.1 GAD (GENERALIZED ANXIETY DISORDER): ICD-10-CM

## 2025-07-15 RX ORDER — ALPRAZOLAM 1 MG/1
1 TABLET ORAL 3 TIMES DAILY
Qty: 90 TABLET | Refills: 1 | Status: SHIPPED | OUTPATIENT
Start: 2025-07-15

## 2025-07-15 NOTE — TELEPHONE ENCOUNTER
Please see attached refill request.    Next scheduled office visit: 9/24/2025.    Last office visit: 2/17/25    Thank you!

## 2025-07-15 NOTE — TELEPHONE ENCOUNTER
Copied from CRM #[id number]. Topic: Medications - Medication Refill  >> Jul 14, 2025  3:52 PM Herlinda wrote:  Who Called: Sandy Evans    Refill or New Rx:Refill  RX Name and Strength:ALPRAZolam (XANAX) 1 MG tablet  How is the patient currently taking it? (ex. 1XDay):3x  Is this a 30 day or 90 day RX:90  Local or Mail Order:local  List of preferred pharmacies on file (remove unneeded): [unfilled][facility] [address] [place] [address]      If different Pharmacy is requested, enter Pharmacy information here including location and phone number:    Ordering Provider:      Preferred Method of Contact: Phone Call  Patient's Preferred Phone Number on File: [phone]   Best Call Back Number, if different:  Additional Information: pt called for med refill pls advise